# Patient Record
Sex: FEMALE | Race: WHITE | URBAN - METROPOLITAN AREA
[De-identification: names, ages, dates, MRNs, and addresses within clinical notes are randomized per-mention and may not be internally consistent; named-entity substitution may affect disease eponyms.]

---

## 2023-12-08 ENCOUNTER — TELEPHONE (OUTPATIENT)
Age: 68
End: 2023-12-08

## 2024-03-07 ENCOUNTER — HOSPITAL ENCOUNTER (OUTPATIENT)
Facility: HOSPITAL | Age: 69
Setting detail: OBSERVATION
Discharge: HOME | End: 2024-03-08
Attending: EMERGENCY MEDICINE | Admitting: HOSPITALIST
Payer: COMMERCIAL

## 2024-03-07 ENCOUNTER — APPOINTMENT (EMERGENCY)
Dept: CT IMAGING | Facility: HOSPITAL | Age: 69
End: 2024-03-07
Payer: COMMERCIAL

## 2024-03-07 DIAGNOSIS — H53.9 VISUAL DISTURBANCE: Primary | ICD-10-CM

## 2024-03-07 DIAGNOSIS — I10 HYPERTENSION: ICD-10-CM

## 2024-03-07 DIAGNOSIS — R42 DIZZINESS: ICD-10-CM

## 2024-03-07 DIAGNOSIS — I65.02 STENOSIS OF LEFT VERTEBRAL ARTERY: ICD-10-CM

## 2024-03-07 LAB
ALBUMIN SERPL BCP-MCNC: 4.2 G/DL (ref 3.5–5)
ALP SERPL-CCNC: 84 U/L (ref 34–104)
ALT SERPL W P-5'-P-CCNC: 15 U/L (ref 7–52)
ANION GAP SERPL CALCULATED.3IONS-SCNC: 6 MMOL/L
APTT PPP: 35 SECONDS (ref 23–37)
AST SERPL W P-5'-P-CCNC: 19 U/L (ref 13–39)
ATRIAL RATE: 62 BPM
BILIRUB SERPL-MCNC: 0.35 MG/DL (ref 0.2–1)
BUN SERPL-MCNC: 20 MG/DL (ref 5–25)
CALCIUM SERPL-MCNC: 10.4 MG/DL (ref 8.4–10.2)
CARDIAC TROPONIN I PNL SERPL HS: 6 NG/L
CHLORIDE SERPL-SCNC: 103 MMOL/L (ref 96–108)
CO2 SERPL-SCNC: 29 MMOL/L (ref 21–32)
CREAT SERPL-MCNC: 0.84 MG/DL (ref 0.6–1.3)
ERYTHROCYTE [DISTWIDTH] IN BLOOD BY AUTOMATED COUNT: 12.6 % (ref 11.6–15.1)
GFR SERPL CREATININE-BSD FRML MDRD: 70 ML/MIN/1.73SQ M
GLUCOSE SERPL-MCNC: 142 MG/DL (ref 65–140)
HCT VFR BLD AUTO: 41.1 % (ref 34.8–46.1)
HGB BLD-MCNC: 13.4 G/DL (ref 11.5–15.4)
INR PPP: 1.07 (ref 0.84–1.19)
MCH RBC QN AUTO: 30.2 PG (ref 26.8–34.3)
MCHC RBC AUTO-ENTMCNC: 32.6 G/DL (ref 31.4–37.4)
MCV RBC AUTO: 93 FL (ref 82–98)
P AXIS: 73 DEGREES
PLATELET # BLD AUTO: 194 THOUSANDS/UL (ref 149–390)
PMV BLD AUTO: 10.3 FL (ref 8.9–12.7)
POTASSIUM SERPL-SCNC: 4.3 MMOL/L (ref 3.5–5.3)
PR INTERVAL: 154 MS
PROT SERPL-MCNC: 7.4 G/DL (ref 6.4–8.4)
PROTHROMBIN TIME: 14.5 SECONDS (ref 11.6–14.5)
QRS AXIS: 19 DEGREES
QRSD INTERVAL: 78 MS
QT INTERVAL: 422 MS
QTC INTERVAL: 428 MS
RBC # BLD AUTO: 4.44 MILLION/UL (ref 3.81–5.12)
SODIUM SERPL-SCNC: 138 MMOL/L (ref 135–147)
T WAVE AXIS: -57 DEGREES
VENTRICULAR RATE: 62 BPM
WBC # BLD AUTO: 10.24 THOUSAND/UL (ref 4.31–10.16)

## 2024-03-07 PROCEDURE — 80061 LIPID PANEL: CPT | Performed by: NURSE PRACTITIONER

## 2024-03-07 PROCEDURE — 99285 EMERGENCY DEPT VISIT HI MDM: CPT | Performed by: EMERGENCY MEDICINE

## 2024-03-07 PROCEDURE — 36415 COLL VENOUS BLD VENIPUNCTURE: CPT

## 2024-03-07 PROCEDURE — 84484 ASSAY OF TROPONIN QUANT: CPT

## 2024-03-07 PROCEDURE — 93005 ELECTROCARDIOGRAM TRACING: CPT

## 2024-03-07 PROCEDURE — 85610 PROTHROMBIN TIME: CPT

## 2024-03-07 PROCEDURE — 99284 EMERGENCY DEPT VISIT MOD MDM: CPT

## 2024-03-07 PROCEDURE — 70496 CT ANGIOGRAPHY HEAD: CPT

## 2024-03-07 PROCEDURE — 85027 COMPLETE CBC AUTOMATED: CPT

## 2024-03-07 PROCEDURE — 70498 CT ANGIOGRAPHY NECK: CPT

## 2024-03-07 PROCEDURE — 80053 COMPREHEN METABOLIC PANEL: CPT | Performed by: EMERGENCY MEDICINE

## 2024-03-07 PROCEDURE — 85730 THROMBOPLASTIN TIME PARTIAL: CPT

## 2024-03-07 RX ORDER — TETRACAINE HYDROCHLORIDE 5 MG/ML
2 SOLUTION OPHTHALMIC ONCE
Status: COMPLETED | OUTPATIENT
Start: 2024-03-07 | End: 2024-03-07

## 2024-03-07 RX ADMIN — IOHEXOL 85 ML: 350 INJECTION, SOLUTION INTRAVENOUS at 21:16

## 2024-03-07 RX ADMIN — TETRACAINE HYDROCHLORIDE 2 DROP: 5 SOLUTION OPHTHALMIC at 23:47

## 2024-03-08 ENCOUNTER — APPOINTMENT (OUTPATIENT)
Dept: MRI IMAGING | Facility: HOSPITAL | Age: 69
End: 2024-03-08
Payer: COMMERCIAL

## 2024-03-08 VITALS
HEART RATE: 64 BPM | WEIGHT: 244.71 LBS | HEIGHT: 63 IN | DIASTOLIC BLOOD PRESSURE: 67 MMHG | BODY MASS INDEX: 43.36 KG/M2 | RESPIRATION RATE: 16 BRPM | TEMPERATURE: 97.9 F | OXYGEN SATURATION: 100 % | SYSTOLIC BLOOD PRESSURE: 153 MMHG

## 2024-03-08 PROBLEM — I48.19 PERSISTENT ATRIAL FIBRILLATION (HCC): Chronic | Status: ACTIVE | Noted: 2020-09-06

## 2024-03-08 PROBLEM — I10 HYPERTENSION: Status: ACTIVE | Noted: 2017-12-05

## 2024-03-08 PROBLEM — R42 DIZZINESS: Status: ACTIVE | Noted: 2024-03-08

## 2024-03-08 PROBLEM — E11.59 HYPERTENSION ASSOCIATED WITH TYPE 2 DIABETES MELLITUS: Chronic | Status: ACTIVE | Noted: 2017-12-05

## 2024-03-08 PROBLEM — I15.2 HYPERTENSION ASSOCIATED WITH TYPE 2 DIABETES MELLITUS: Chronic | Status: ACTIVE | Noted: 2017-12-05

## 2024-03-08 PROBLEM — E66.813 CLASS 3 SEVERE OBESITY DUE TO EXCESS CALORIES WITH SERIOUS COMORBIDITY IN ADULT (HCC): Status: ACTIVE | Noted: 2023-02-14

## 2024-03-08 PROBLEM — Z79.01 LONG TERM CURRENT USE OF ANTICOAGULANT THERAPY: Chronic | Status: ACTIVE | Noted: 2023-09-29

## 2024-03-08 PROBLEM — E66.01 CLASS 3 SEVERE OBESITY DUE TO EXCESS CALORIES WITH SERIOUS COMORBIDITY IN ADULT (HCC): Status: ACTIVE | Noted: 2023-02-14

## 2024-03-08 PROBLEM — E78.5 HYPERLIPIDEMIA: Status: ACTIVE | Noted: 2017-12-05
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
## 2024-03-08 PROBLEM — G47.33 OBSTRUCTIVE SLEEP APNEA: Chronic | Status: ACTIVE | Noted: 2017-12-05

## 2024-03-08 PROBLEM — E11.9 TYPE 2 DIABETES MELLITUS WITHOUT COMPLICATION, WITHOUT LONG-TERM CURRENT USE OF INSULIN (HCC): Chronic | Status: ACTIVE | Noted: 2017-12-05

## 2024-03-08 PROBLEM — E78.5 HYPERLIPIDEMIA ASSOCIATED WITH TYPE 2 DIABETES MELLITUS: Chronic | Status: ACTIVE | Noted: 2017-12-05

## 2024-03-08 PROBLEM — E11.69 HYPERLIPIDEMIA ASSOCIATED WITH TYPE 2 DIABETES MELLITUS: Chronic | Status: ACTIVE | Noted: 2017-12-05

## 2024-03-08 LAB
2HR DELTA HS TROPONIN: 1 NG/L
4HR DELTA HS TROPONIN: 0 NG/L
CARDIAC TROPONIN I PNL SERPL HS: 6 NG/L
CARDIAC TROPONIN I PNL SERPL HS: 7 NG/L
CHOLEST SERPL-MCNC: 159 MG/DL
GLUCOSE SERPL-MCNC: 118 MG/DL (ref 65–140)
GLUCOSE SERPL-MCNC: 151 MG/DL (ref 65–140)
HDLC SERPL-MCNC: 68 MG/DL
LDLC SERPL CALC-MCNC: 77 MG/DL (ref 0–100)
TRIGL SERPL-MCNC: 69 MG/DL

## 2024-03-08 PROCEDURE — 99223 1ST HOSP IP/OBS HIGH 75: CPT | Performed by: NURSE PRACTITIONER

## 2024-03-08 PROCEDURE — 84484 ASSAY OF TROPONIN QUANT: CPT

## 2024-03-08 PROCEDURE — 70551 MRI BRAIN STEM W/O DYE: CPT

## 2024-03-08 PROCEDURE — 97129 THER IVNTJ 1ST 15 MIN: CPT

## 2024-03-08 PROCEDURE — 97167 OT EVAL HIGH COMPLEX 60 MIN: CPT

## 2024-03-08 PROCEDURE — 99239 HOSP IP/OBS DSCHRG MGMT >30: CPT | Performed by: INTERNAL MEDICINE

## 2024-03-08 PROCEDURE — 82948 REAGENT STRIP/BLOOD GLUCOSE: CPT

## 2024-03-08 PROCEDURE — 36415 COLL VENOUS BLD VENIPUNCTURE: CPT

## 2024-03-08 RX ORDER — LOSARTAN POTASSIUM 25 MG/1
12.5 TABLET ORAL DAILY
Status: DISCONTINUED | OUTPATIENT
Start: 2024-03-08 | End: 2024-03-08

## 2024-03-08 RX ORDER — ATORVASTATIN CALCIUM 10 MG/1
10 TABLET, FILM COATED ORAL
COMMUNITY

## 2024-03-08 RX ORDER — SOTALOL HYDROCHLORIDE 80 MG/1
80 TABLET ORAL EVERY 12 HOURS
COMMUNITY
Start: 2023-11-20 | End: 2024-11-19

## 2024-03-08 RX ORDER — LOSARTAN POTASSIUM 25 MG/1
25 TABLET ORAL ONCE
Status: DISCONTINUED | OUTPATIENT
Start: 2024-03-08 | End: 2024-03-08 | Stop reason: HOSPADM

## 2024-03-08 RX ORDER — LOSARTAN POTASSIUM 50 MG/1
50 TABLET ORAL DAILY
Status: DISCONTINUED | OUTPATIENT
Start: 2024-03-09 | End: 2024-03-08

## 2024-03-08 RX ORDER — SOTALOL HYDROCHLORIDE 80 MG/1
80 TABLET ORAL EVERY 12 HOURS SCHEDULED
Status: DISCONTINUED | OUTPATIENT
Start: 2024-03-08 | End: 2024-03-08 | Stop reason: HOSPADM

## 2024-03-08 RX ORDER — HYDRALAZINE HYDROCHLORIDE 20 MG/ML
10 INJECTION INTRAMUSCULAR; INTRAVENOUS EVERY 6 HOURS PRN
Status: DISCONTINUED | OUTPATIENT
Start: 2024-03-08 | End: 2024-03-08 | Stop reason: HOSPADM

## 2024-03-08 RX ORDER — ATORVASTATIN CALCIUM 40 MG/1
40 TABLET, FILM COATED ORAL EVERY EVENING
Qty: 30 TABLET | Refills: 0 | Status: CANCELLED | OUTPATIENT
Start: 2024-03-08 | End: 2024-04-07

## 2024-03-08 RX ORDER — CHOLECALCIFEROL (VITAMIN D3) 25 MCG
1000 CAPSULE ORAL 2 TIMES DAILY
COMMUNITY

## 2024-03-08 RX ORDER — LOSARTAN POTASSIUM 25 MG/1
0.5 TABLET ORAL DAILY
COMMUNITY
Start: 2023-11-07 | End: 2024-03-08

## 2024-03-08 RX ORDER — INSULIN LISPRO 100 [IU]/ML
1-5 INJECTION, SOLUTION INTRAVENOUS; SUBCUTANEOUS
Status: DISCONTINUED | OUTPATIENT
Start: 2024-03-08 | End: 2024-03-08 | Stop reason: HOSPADM

## 2024-03-08 RX ORDER — LOSARTAN POTASSIUM 25 MG/1
25 TABLET ORAL DAILY
Status: DISCONTINUED | OUTPATIENT
Start: 2024-03-09 | End: 2024-03-08 | Stop reason: HOSPADM

## 2024-03-08 RX ORDER — ATORVASTATIN CALCIUM 40 MG/1
40 TABLET, FILM COATED ORAL EVERY EVENING
Status: DISCONTINUED | OUTPATIENT
Start: 2024-03-08 | End: 2024-03-08 | Stop reason: HOSPADM

## 2024-03-08 RX ORDER — INSULIN LISPRO 100 [IU]/ML
1-6 INJECTION, SOLUTION INTRAVENOUS; SUBCUTANEOUS
Status: DISCONTINUED | OUTPATIENT
Start: 2024-03-08 | End: 2024-03-08 | Stop reason: HOSPADM

## 2024-03-08 RX ORDER — MELATONIN
1000
Status: DISCONTINUED | OUTPATIENT
Start: 2024-03-08 | End: 2024-03-08 | Stop reason: HOSPADM

## 2024-03-08 RX ORDER — LOSARTAN POTASSIUM 25 MG/1
25 TABLET ORAL DAILY
Qty: 30 TABLET | Refills: 0 | Status: SHIPPED | OUTPATIENT
Start: 2024-03-09 | End: 2024-04-08

## 2024-03-08 RX ADMIN — Medication 1000 UNITS: at 01:59

## 2024-03-08 RX ADMIN — APIXABAN 5 MG: 5 TABLET, FILM COATED ORAL at 01:59

## 2024-03-08 RX ADMIN — SOTALOL HYDROCHLORIDE 80 MG: 80 TABLET ORAL at 02:06

## 2024-03-08 RX ADMIN — HYDRALAZINE HYDROCHLORIDE 10 MG: 20 INJECTION, SOLUTION INTRAMUSCULAR; INTRAVENOUS at 10:44

## 2024-03-08 RX ADMIN — APIXABAN 5 MG: 5 TABLET, FILM COATED ORAL at 14:14

## 2024-03-08 RX ADMIN — LOSARTAN POTASSIUM 12.5 MG: 25 TABLET, FILM COATED ORAL at 10:41

## 2024-03-08 RX ADMIN — SOTALOL HYDROCHLORIDE 80 MG: 80 TABLET ORAL at 14:14

## 2024-03-08 NOTE — ASSESSMENT & PLAN NOTE
Blood pressure elevated on arrival.  Last recorded pressure: 171/70  Remained elevated while in observation     Plan   Increase Losartan dose from 12.5 mg to 25 mg daily  Outpatient follow-up with PCP in 1 week of discharge

## 2024-03-08 NOTE — ASSESSMENT & PLAN NOTE
Lab Results   Component Value Date    HGBA1C 6.6 (H) 01/22/2024       Recent Labs     03/08/24  0710 03/08/24  1132   POCGLU 118 151*       Blood Sugar Average: Last 72 hrs:  Continue home regimen of metformin.
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
## 2024-03-08 NOTE — ASSESSMENT & PLAN NOTE
Patient reports palpitations two days ago but denies at this time.  Rate/Rhythm Control: Sotalol  Antiplatelet/Anticoagulation: Eliquis
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
## 2024-03-08 NOTE — ASSESSMENT & PLAN NOTE
"Lab Results   Component Value Date    HGBA1C 6.6 (H) 01/22/2024       No results for input(s): \"POCGLU\" in the last 72 hours.    Blood Sugar Average: Last 72 hrs:  Home regimen of Metformin, hold while inpatient.  Accu-checks and SSI.  Hypoglycemia protocol.    "
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
## 2024-03-08 NOTE — QUICK NOTE
For documentation purposes for scheduling    Patient to follow-up with general neurology attending/AP/resident in 6 to 8 weeks

## 2024-03-08 NOTE — DISCHARGE INSTR - AVS FIRST PAGE
Dear Elena Bhardwaj,     It was our pleasure to care for you here at [facility].  It is our hope that we were always able to exceed the expected standards for your care during your stay.  You were hospitalized due to visual disturbance and dizziness.  You were cared for in the ED by Rita Paredes DO under the service of Soren Don MD with the [facility] who covers for your primary care physician (PCP), No primary care provider on file., while you were hospitalized.  If you have any questions or concerns related to this hospitalization, you may contact us at .  For follow up as well as any medication refills, we recommend that you follow up with your primary care physician.  A registered nurse will reach out to you by phone within a few days after your discharge to answer any additional questions that you may have after going home.  However, at this time we provide for you here, the most important instructions / recommendations at discharge:     Notable Medication Adjustments -   Please INCREASE your dose of Losartan to 25 mg daily by mouth  Testing Required after Discharge -   No testing required after discharge   Important follow up information -   Please follow-up with your PCP within one week of discharge to discuss your recent hospitalization and to follow-up on your blood pressure given the recent medication adjustments  If your symptoms return, you may elect to schedule an appoint with ENT for further management options. A referral has been placed on your behalf.   Other Instructions -   If you begin to experience sudden onset of facial droop, slurred speech, or extremity weakness, please go to the nearest ED.  Please review this entire after visit summary as additional general instructions including medication list, appointments, activity, diet, any pertinent wound care, and other additional recommendations from your care team that  may be provided for you.      Sincerely,     Rita Paredes, DO
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
## 2024-03-08 NOTE — ASSESSMENT & PLAN NOTE
Blood pressure elevated on arrival.  Last recorded pressure: 171/70  Continue Losartan and Sotalol.  Monitor blood pressure.
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
## 2024-03-11 LAB
ATRIAL RATE: 62 BPM
P AXIS: 73 DEGREES
PR INTERVAL: 154 MS
QRS AXIS: 19 DEGREES
QRSD INTERVAL: 78 MS
QT INTERVAL: 422 MS
QTC INTERVAL: 428 MS
T WAVE AXIS: -57 DEGREES
VENTRICULAR RATE: 62 BPM

## 2024-03-11 PROCEDURE — 93010 ELECTROCARDIOGRAM REPORT: CPT | Performed by: INTERNAL MEDICINE

## 2024-04-03 ENCOUNTER — OFFICE VISIT (OUTPATIENT)
Dept: AUDIOLOGY | Age: 69
End: 2024-04-03
Payer: COMMERCIAL

## 2024-04-03 DIAGNOSIS — R42 DIZZINESS: ICD-10-CM

## 2024-04-03 DIAGNOSIS — R26.89 IMBALANCE: ICD-10-CM

## 2024-04-03 PROCEDURE — 92567 TYMPANOMETRY: CPT | Performed by: AUDIOLOGIST

## 2024-04-03 PROCEDURE — 92537 CALORIC VSTBLR TEST W/REC: CPT | Performed by: AUDIOLOGIST

## 2024-04-03 PROCEDURE — 92540 BASIC VESTIBULAR EVALUATION: CPT | Performed by: AUDIOLOGIST
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
## 2024-04-09 ENCOUNTER — TELEPHONE (OUTPATIENT)
Dept: NEUROLOGY | Facility: CLINIC | Age: 69
End: 2024-04-09

## 2024-04-09 NOTE — TELEPHONE ENCOUNTER
Patient is now scheduled with Reggie Jordan, 3 pm, 5/9/2024 HFU Slot.      HFU/ SL RANDELL/ DIZZINESS, VISUAL DISTURBANCE    DC- HOME- 3/8/2024    Patient to follow-up with general neurology attending/AP/resident in 6 to 8 weeks

## 2024-05-03 ENCOUNTER — TELEPHONE (OUTPATIENT)
Age: 69
End: 2024-05-03

## 2024-05-03 DIAGNOSIS — Z12.11 SCREENING FOR COLON CANCER: Primary | ICD-10-CM

## 2024-05-06 ENCOUNTER — TELEPHONE (OUTPATIENT)
Dept: NEUROLOGY | Facility: CLINIC | Age: 69
End: 2024-05-06

## 2024-05-06 NOTE — TELEPHONE ENCOUNTER
Spoke with patient and confirmed appointment with Dr. Gomez 5/9 at [facility].
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
## 2024-05-08 NOTE — ASSESSMENT & PLAN NOTE
Patient is a 68 year old woman with hx of Afib on eliquis, possible lyme disease, T2DM, HTN presenting for ED followup for transient dizziness, lightheadedness, visual disturbances. Lasted for 15 minutes before complete resolution. No prodromal symptoms. Patient did have headaches afterward. Patient did not have further symptoms with transient dizziness, lightheadedness, and visual disturbance afterwards. She denies new focal numbness, weakness, visual disturbances, ambulatory dysfunction.     Workup:  MRI brain wo contrast 3/8/2024: no acute infarct and mild microangiopathic change  CTA head and neck w and wo contrast 3/7/2024: no hemodynamic stenosis, aneurysm, occlusion  Vitamin B12 12/1/2023: 216    Impression: Broad differential for transient neurologic symptoms above include atypical migraine with brainstem/ocular aura, cardiogenic syncope, Symptomatic low B12 perhaps with autoimmune component.     Plan:  Discussed with Dr. Romano  Ordered ESR, CRP, B1, intrinsic factor blocking antibody for potential inflammatory conditions that may cause transient visual disturbances  Ordered B12 injections 1g weekly for 4 doses then B12 injections 1g monthly for 5 doses; can recheck B12 afterwards to monitor for improvment   Alternative is sublingual B12  Discussed regular hydration daily as recommended by PCP  Patient to followup in 4 months  Patient agreeable to above plan

## 2024-05-09 ENCOUNTER — OFFICE VISIT (OUTPATIENT)
Dept: NEUROLOGY | Facility: CLINIC | Age: 69
End: 2024-05-09
Payer: COMMERCIAL

## 2024-05-09 VITALS
DIASTOLIC BLOOD PRESSURE: 60 MMHG | WEIGHT: 230 LBS | HEART RATE: 86 BPM | SYSTOLIC BLOOD PRESSURE: 130 MMHG | BODY MASS INDEX: 40.74 KG/M2

## 2024-05-09 DIAGNOSIS — H53.9 VISUAL DISTURBANCE: ICD-10-CM

## 2024-05-09 DIAGNOSIS — E53.8 LOW SERUM VITAMIN B12: ICD-10-CM

## 2024-05-09 DIAGNOSIS — R42 DIZZINESS: Primary | ICD-10-CM

## 2024-05-09 PROCEDURE — 96372 THER/PROPH/DIAG INJ SC/IM: CPT | Performed by: PSYCHIATRY & NEUROLOGY

## 2024-05-09 PROCEDURE — 99204 OFFICE O/P NEW MOD 45 MIN: CPT | Performed by: PSYCHIATRY & NEUROLOGY

## 2024-05-09 RX ORDER — CYANOCOBALAMIN 1000 UG/ML
1000 INJECTION, SOLUTION INTRAMUSCULAR; SUBCUTANEOUS
Status: SHIPPED | OUTPATIENT
Start: 2024-05-09 | End: 2024-06-06

## 2024-05-09 RX ORDER — CYANOCOBALAMIN 1000 UG/ML
1000 INJECTION, SOLUTION INTRAMUSCULAR; SUBCUTANEOUS
Status: SHIPPED | OUTPATIENT
Start: 2024-06-27 | End: 2024-11-24

## 2024-05-09 RX ADMIN — CYANOCOBALAMIN 1000 MCG: 1000 INJECTION, SOLUTION INTRAMUSCULAR; SUBCUTANEOUS at 16:31

## 2024-05-09 NOTE — PATIENT INSTRUCTIONS
Please come to the clinic for B12 injections every week (starting today) for 4 doses and then injections every month for 5 doses    Please get bloodwork for intrinsic factor antibody that can cause low B12    Please get other bloodwork as well    Please keep rehydrating as your primary care doctor instructed    Followup in 4 months

## 2024-05-11 LAB — COLOGUARD RESULT REPORTABLE: NORMAL

## 2024-05-13 ENCOUNTER — TELEPHONE (OUTPATIENT)
Dept: NEUROLOGY | Facility: CLINIC | Age: 69
End: 2024-05-13

## 2024-05-14 ENCOUNTER — TELEPHONE (OUTPATIENT)
Age: 69
End: 2024-05-14

## 2024-05-14 DIAGNOSIS — Z12.11 SCREENING FOR COLON CANCER: Primary | ICD-10-CM

## 2024-05-16 ENCOUNTER — CLINICAL SUPPORT (OUTPATIENT)
Dept: NEUROLOGY | Facility: CLINIC | Age: 69
End: 2024-05-16
Payer: COMMERCIAL

## 2024-05-16 DIAGNOSIS — E53.8 LOW SERUM VITAMIN B12: Primary | ICD-10-CM

## 2024-05-16 PROCEDURE — 96372 THER/PROPH/DIAG INJ SC/IM: CPT | Performed by: PSYCHIATRY & NEUROLOGY

## 2024-05-20 ENCOUNTER — TELEPHONE (OUTPATIENT)
Dept: NEUROLOGY | Facility: CLINIC | Age: 69
End: 2024-05-20

## 2024-05-20 NOTE — TELEPHONE ENCOUNTER
Left voicemail for patient to call back or on my chart confirm B12 injection appointment for 5/23 at 9 am in [facility] office.

## 2024-05-21 NOTE — TELEPHONE ENCOUNTER
Spoke with patient and confirm B12 injection appointment for 5/23 at [facility].

## 2024-05-23 ENCOUNTER — CLINICAL SUPPORT (OUTPATIENT)
Dept: NEUROLOGY | Facility: CLINIC | Age: 69
End: 2024-05-23
Payer: COMMERCIAL

## 2024-05-23 DIAGNOSIS — E53.8 B12 DEFICIENCY: Primary | ICD-10-CM

## 2024-05-23 PROCEDURE — 96372 THER/PROPH/DIAG INJ SC/IM: CPT | Performed by: PSYCHIATRY & NEUROLOGY

## 2024-05-23 RX ADMIN — CYANOCOBALAMIN 1000 MCG: 1000 INJECTION, SOLUTION INTRAMUSCULAR; SUBCUTANEOUS at 15:50

## 2024-05-23 NOTE — PROGRESS NOTES
Patient was administered B12 injection on the L Deltoid via IM 1ml. Patient was monitored and left with no complaints.

## 2024-05-28 LAB — COLOGUARD RESULT REPORTABLE: NEGATIVE

## 2024-05-29 ENCOUNTER — TELEPHONE (OUTPATIENT)
Dept: NEUROLOGY | Facility: CLINIC | Age: 69
End: 2024-05-29

## 2024-05-30 ENCOUNTER — CLINICAL SUPPORT (OUTPATIENT)
Dept: NEUROLOGY | Facility: CLINIC | Age: 69
End: 2024-05-30
Payer: COMMERCIAL

## 2024-05-30 DIAGNOSIS — E53.8 VITAMIN B 12 DEFICIENCY: Primary | ICD-10-CM

## 2024-05-30 PROCEDURE — 96372 THER/PROPH/DIAG INJ SC/IM: CPT | Performed by: PSYCHIATRY & NEUROLOGY

## 2024-05-30 RX ADMIN — CYANOCOBALAMIN 1000 MCG: 1000 INJECTION, SOLUTION INTRAMUSCULAR; SUBCUTANEOUS at 10:00

## 2024-07-08 ENCOUNTER — CLINICAL SUPPORT (OUTPATIENT)
Dept: NEUROLOGY | Facility: CLINIC | Age: 69
End: 2024-07-08
Payer: COMMERCIAL

## 2024-07-08 DIAGNOSIS — E53.8 B12 DEFICIENCY: Primary | ICD-10-CM

## 2024-07-08 PROCEDURE — 96372 THER/PROPH/DIAG INJ SC/IM: CPT | Performed by: INTERNAL MEDICINE

## 2024-07-08 RX ADMIN — CYANOCOBALAMIN 1000 MCG: 1000 INJECTION, SOLUTION INTRAMUSCULAR; SUBCUTANEOUS at 09:06

## 2024-08-02 ENCOUNTER — CLINICAL SUPPORT (OUTPATIENT)
Dept: NEUROLOGY | Facility: CLINIC | Age: 69
End: 2024-08-02
Payer: COMMERCIAL

## 2024-08-02 DIAGNOSIS — E53.8 B12 DEFICIENCY: Primary | ICD-10-CM

## 2024-08-02 PROCEDURE — 96372 THER/PROPH/DIAG INJ SC/IM: CPT | Performed by: PSYCHIATRY & NEUROLOGY

## 2024-08-02 RX ADMIN — CYANOCOBALAMIN 1000 MCG: 1000 INJECTION, SOLUTION INTRAMUSCULAR; SUBCUTANEOUS at 15:03

## 2024-09-06 ENCOUNTER — CLINICAL SUPPORT (OUTPATIENT)
Dept: NEUROLOGY | Facility: CLINIC | Age: 69
End: 2024-09-06
Payer: COMMERCIAL

## 2024-09-06 DIAGNOSIS — E53.8 B12 DEFICIENCY: Primary | ICD-10-CM

## 2024-09-06 PROCEDURE — 96372 THER/PROPH/DIAG INJ SC/IM: CPT | Performed by: PSYCHIATRY & NEUROLOGY

## 2024-09-06 RX ADMIN — CYANOCOBALAMIN 1000 MCG: 1000 INJECTION, SOLUTION INTRAMUSCULAR; SUBCUTANEOUS at 14:52

## 2024-09-12 ENCOUNTER — OFFICE VISIT (OUTPATIENT)
Dept: NEUROLOGY | Facility: CLINIC | Age: 69
End: 2024-09-12
Payer: COMMERCIAL

## 2024-09-12 VITALS
WEIGHT: 236 LBS | HEIGHT: 63 IN | OXYGEN SATURATION: 98 % | DIASTOLIC BLOOD PRESSURE: 69 MMHG | SYSTOLIC BLOOD PRESSURE: 130 MMHG | HEART RATE: 57 BPM | BODY MASS INDEX: 41.82 KG/M2

## 2024-09-12 DIAGNOSIS — R42 DIZZINESS: Primary | ICD-10-CM
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
## 2024-09-12 PROCEDURE — 99214 OFFICE O/P EST MOD 30 MIN: CPT | Performed by: PSYCHIATRY & NEUROLOGY

## 2024-09-12 NOTE — ASSESSMENT & PLAN NOTE
Patient notes that she had a fall where she had loss of consciousness and fell and hit the back of her head.  She noted some neck and back pain after waking up from her fall, did not think of going to the ER at this time.  She had this neck and back pain for about 3 days before resolving.  She states that she is back to her baseline and her physical exam does not show any focal findings.  She has a negative Romberg and her 1 leg standing balance is good.     As patient is on Eliquis for history of atrial fibrillation we did recommend the patient get a CT head.  As patient does not have any neurologic symptoms or findings on exam this study does not need to be done urgently, but should be done to rule out any small bleeds.    Plan:  Obtain CT head without contrast.

## 2024-09-12 NOTE — PROGRESS NOTES
Patient ID: Elena Bhardwaj is a 69 y.o. female.    Assessment/Plan:    Dizziness  Patient is a 69 year old woman with hx of Afib on eliquis, possible lyme disease, T2DM, HTN presenting for ED followup for transient dizziness, lightheadedness, visual disturbances. Lasted for 15 minutes before complete resolution. No prodromal symptoms. Patient did have headaches afterward. Patient did not have further symptoms with transient dizziness, lightheadedness, and visual disturbance afterwards. She denies new focal numbness, weakness, visual disturbances, ambulatory dysfunction.     Symptoms have resolved after decreasing sotalol from 80 mg twice daily to 40 mg twice daily per her cardiologist.    Workup:  MRI brain wo contrast 3/8/2024: no acute infarct and mild microangiopathic change  CTA head and neck w and wo contrast 3/7/2024: no hemodynamic stenosis, aneurysm, occlusion  Vitamin B12 12/1/2023: 216  5/2024: 5549    Impression: Patient dizziness spells have since resolved after decreasing her sotalol from 80 mg twice daily to 40 mg twice daily.  It is unlikely that patient has any neurologic component to this dizziness, vitamin B12 supplementation generally does not improve any central causes of the symptoms.    Plan:  Discussed with Dr. Romano  As patient denies any dizziness she does not need to obtain lab work.  Patient may continue to get her B12 injections up to November, recommended patient to take supplemental B12 orally 1000 mcg daily.  Continue regular hydration.  Patient to followup in 1 year  Patient agreeable to above plan      Fall  Patient notes that she had a fall where she had loss of consciousness and fell and hit the back of her head.  She noted some neck and back pain after waking up from her fall, did not think of going to the ER at this time.  She had this neck and back pain for about 3 days before resolving.  She states that she is back to her baseline and her physical exam does not show any  "focal findings.  She has a negative Romberg and her 1 leg standing balance is good.     As patient is on Eliquis for history of atrial fibrillation we did recommend the patient get a CT head.  As patient does not have any neurologic symptoms or findings on exam this study does not need to be done urgently, but should be done to rule out any small bleeds.    Plan:  Obtain CT head without contrast.       Diagnoses and all orders for this visit:    Dizziness    Fall  -     CT head wo contrast; Future             Subjective:    Patient is a 69 year old woman with hx of Afib on eliquis, possible lyme disease 2005, HTN, VANNA, T2DM, HLD presenting for followup for Dizziness 3/7/2024.  She notes resolution of her dizziness spells since decreasing her Sotalol 40 mg BID.  She also notes improvement of her hydration and drinks about 64 oz a day of water.  She denies any headaches, lightheadedness, weakness, numbness or tingling, visual disturbances.  She continues to use her mouthguard for VANNA and state sit works well for her.  She states she has some vision blurriness but is following her eye doctor for a procedure in the coming months.    She denies any gait instability but states she had a fall at the end of June with head strike and LOC before she had her fall.  She notes having some back and neck pain for a few days but since then she has felt back to her normal.     She did not go the ED after this fall.              5/24 LOV  \"Patient is a 68 year old woman with hx of Afib on eliquis, possible lyme disease 2005, HTN, VANNA, T2DM, HLD presenting for follow up for Dizziness 3/7/2024.  Patient had sudden onset visual disturbances and described \"the road all of a sudden became wavy like a big black swirl.\"  Patient also had dizziness, lightheadedness, chest tightness.  Symptoms lasted 15 minutes before resolving. No trauma involving. CTA head and neck 3/7 showed no hemodynamic stenosis, aneurysm, occlusion and MRI brain wo " "contrast 3/8 showed no acute infarct and mild microangiopathic change. Patient had no recent stress until after the visit and patient had headaches afterward which she attributes to stress over the ED visit. Neurology thought it to be symptomatic Afib vs migraine w aura. Patient currently on Eliquis 5mg BID for Afib.  No smoking, no alcohol use, no recreational drugs.     Patient said September 2020, patient had vertigo and diagnosed with Afib at that month. Vertigo was not positional and lasted for a day. MRI brain w and wo contrast done at that time and patient was found not to have a stroke.\"                  The following portions of the patient's history were reviewed and updated as appropriate: She  has a past medical history of Cancer ([facility]), Diabetes mellitus ([facility]), Dizziness, Hypertension, Sleep apnea, Sleep difficulties, and Stroke ([facility]).  She   Patient Active Problem List    Diagnosis Date Noted    Fall 09/12/2024    Low serum vitamin B12 05/09/2024    Visual disturbance 05/09/2024    Dizziness 03/08/2024    Long term current use of anticoagulant therapy 09/29/2023    Class 3 severe obesity due to excess calories with serious comorbidity in adult (HCC) 02/14/2023    Persistent atrial fibrillation (HCC) 09/06/2020    Type 2 diabetes mellitus without complication, without long-term current use of insulin ([facility]) 12/05/2017    Hypertension 12/05/2017    Hyperlipidemia 12/05/2017    Obstructive sleep apnea 12/05/2017     She  has a past surgical history that includes Tonsillectomy.  Her family history includes Stroke in her maternal aunt and paternal grandmother.  She  reports that she has never smoked. She has never used smokeless tobacco. She reports that she does not drink alcohol and does not use drugs.  Current Outpatient Medications   Medication Sig Dispense Refill    apixaban (ELIQUIS) 5 mg Take 5 mg by mouth 2 (two) times a day      atorvastatin (LIPITOR) 10 mg tablet Take 10 mg by mouth daily at bedtime " Pt only take 5 mg cuts pill in half      Cholecalciferol (Vitamin D-3) 25 MCG (1000 UT) CAPS Take 1,000 Units by mouth 2 (two) times a day      losartan (COZAAR) 25 mg tablet Take 1 tablet (25 mg total) by mouth daily Do not start before March 9, 2024. (Patient taking differently: Take 25 mg by mouth daily Patient take 1/2 tablet daily) 30 tablet 0    metFORMIN (GLUCOPHAGE) 500 mg tablet Take 500 mg by mouth 2 (two) times a day with meals      Multiple Vitamins-Minerals (Multi For Her 50+) TABS       sotalol (BETAPACE) 80 mg tablet Take 40 mg by mouth every 12 (twelve) hours Pt takes 40 mg twice a day      vitamin A 2400 MCG (8000 UT) capsule Take 8,000 Units by mouth daily (Patient not taking: Reported on 3/19/2024)       Current Facility-Administered Medications   Medication Dose Route Frequency Provider Last Rate Last Admin    cyanocobalamin injection 1,000 mcg  1,000 mcg Intramuscular Q30 Days    1,000 mcg at 09/06/24 1452     Current Outpatient Medications on File Prior to Visit   Medication Sig    apixaban (ELIQUIS) 5 mg Take 5 mg by mouth 2 (two) times a day    atorvastatin (LIPITOR) 10 mg tablet Take 10 mg by mouth daily at bedtime Pt only take 5 mg cuts pill in half    Cholecalciferol (Vitamin D-3) 25 MCG (1000 UT) CAPS Take 1,000 Units by mouth 2 (two) times a day    losartan (COZAAR) 25 mg tablet Take 1 tablet (25 mg total) by mouth daily Do not start before March 9, 2024. (Patient taking differently: Take 25 mg by mouth daily Patient take 1/2 tablet daily)    metFORMIN (GLUCOPHAGE) 500 mg tablet Take 500 mg by mouth 2 (two) times a day with meals    Multiple Vitamins-Minerals (Multi For Her 50+) TABS     sotalol (BETAPACE) 80 mg tablet Take 40 mg by mouth every 12 (twelve) hours Pt takes 40 mg twice a day    vitamin A 2400 MCG (8000 UT) capsule Take 8,000 Units by mouth daily (Patient not taking: Reported on 3/19/2024)     Current Facility-Administered Medications on File Prior to Visit   Medication     "cyanocobalamin injection 1,000 mcg     She is allergic to [place] (diagnostic) - food allergy, lisinopril, semaglutide, and clindamycin..         Objective:    Blood pressure 130/69, pulse 57, height 5' 3\" (1.6 m), weight 107 kg (236 lb), SpO2 98%.    Physical Exam  Eyes:      General: Lids are normal.      Extraocular Movements: Extraocular movements intact.      Pupils: Pupils are equal, round, and reactive to light.   Neurological:      Motor: Motor strength is normal.     Coordination: Romberg sign negative.      Deep Tendon Reflexes: Reflexes are normal and symmetric.   Psychiatric:         Speech: Speech normal.         Neurological Exam  Mental Status  Awake, alert and oriented to person, place and time. Speech is normal. Language is fluent with no aphasia. Attention and concentration are normal.    Cranial Nerves  CN II: Visual fields full to confrontation.  CN III, IV, VI: Extraocular movements intact bilaterally. Normal lids and orbits bilaterally. Pupils equal round and reactive to light bilaterally.  CN V: Facial sensation is normal.  CN VII: Full and symmetric facial movement.  CN VIII: Hearing is normal.  CN IX, X: Palate elevates symmetrically  CN XI: Shoulder shrug strength is normal.  CN XII: Tongue midline without atrophy or fasciculations.    Motor  Normal muscle bulk throughout. Normal muscle tone. No abnormal involuntary movements. Strength is 5/5 throughout all four extremities.    Sensory  Light touch is normal in upper and lower extremities.     Reflexes  Deep tendon reflexes are 2+ and symmetric in all four extremities.    Coordination  Right: Finger-to-nose normal. Rapid alternating movement normal. Heel-to-shin normal.Left: Finger-to-nose normal. Rapid alternating movement normal. Heel-to-shin normal.    Gait  Normal casual, toe, heel and tandem gait. Romberg is absent. Unable to rise from chair without using arms.        ROS:    Review of Systems   Constitutional:  Negative for fatigue and " fever.   HENT:  Negative for trouble swallowing and voice change.    Eyes:  Negative for photophobia and visual disturbance.   Respiratory:  Negative for chest tightness and shortness of breath.    Cardiovascular:  Negative for chest pain and palpitations.   Gastrointestinal:  Negative for constipation and diarrhea.   Endocrine: Negative for polyuria.   Genitourinary:  Negative for difficulty urinating and dysuria.   Musculoskeletal:  Positive for back pain. Negative for neck pain.   Skin:  Negative for rash.   Neurological:  Negative for dizziness, tremors, seizures, syncope, facial asymmetry, speech difficulty, weakness, light-headedness, numbness and headaches.   Psychiatric/Behavioral:  Negative for dysphoric mood. The patient is not nervous/anxious.

## 2024-09-12 NOTE — ASSESSMENT & PLAN NOTE
Patient is a 69 year old woman with hx of Afib on eliquis, possible lyme disease, T2DM, HTN presenting for ED followup for transient dizziness, lightheadedness, visual disturbances. Lasted for 15 minutes before complete resolution. No prodromal symptoms. Patient did have headaches afterward. Patient did not have further symptoms with transient dizziness, lightheadedness, and visual disturbance afterwards. She denies new focal numbness, weakness, visual disturbances, ambulatory dysfunction.     Symptoms have resolved after decreasing sotalol from 80 mg twice daily to 40 mg twice daily per her cardiologist.    Workup:  MRI brain wo contrast 3/8/2024: no acute infarct and mild microangiopathic change  CTA head and neck w and wo contrast 3/7/2024: no hemodynamic stenosis, aneurysm, occlusion  Vitamin B12 12/1/2023: 216  5/2024: 5549    Impression: Patient dizziness spells have since resolved after decreasing her sotalol from 80 mg twice daily to 40 mg twice daily.  It is unlikely that patient has any neurologic component to this dizziness, vitamin B12 supplementation generally does not improve any central causes of the symptoms.    Plan:  Discussed with Dr. Romano  As patient denies any dizziness she does not need to obtain lab work.  Patient may continue to get her B12 injections up to November, recommended patient to take supplemental B12 orally 1000 mcg daily.  Continue regular hydration.  Patient to followup in 1 year  Patient agreeable to above plan

## 2024-09-17 ENCOUNTER — LAB (OUTPATIENT)
Dept: LAB | Facility: CLINIC | Age: 69
End: 2024-09-17
Payer: COMMERCIAL

## 2024-09-17 DIAGNOSIS — H53.9 VISUAL DISTURBANCE: ICD-10-CM

## 2024-09-17 DIAGNOSIS — E53.8 LOW SERUM VITAMIN B12: ICD-10-CM

## 2024-09-17 DIAGNOSIS — R42 DIZZINESS: ICD-10-CM

## 2024-09-17 LAB
CRP SERPL QL: 6.7 MG/L
ERYTHROCYTE [SEDIMENTATION RATE] IN BLOOD: 35 MM/HOUR (ref 0–29)

## 2024-09-17 PROCEDURE — 36415 COLL VENOUS BLD VENIPUNCTURE: CPT

## 2024-09-17 PROCEDURE — 86140 C-REACTIVE PROTEIN: CPT

## 2024-09-17 PROCEDURE — 86340 INTRINSIC FACTOR ANTIBODY: CPT

## 2024-09-17 PROCEDURE — 84425 ASSAY OF VITAMIN B-1: CPT

## 2024-09-17 PROCEDURE — 85652 RBC SED RATE AUTOMATED: CPT

## 2024-09-19 LAB — IF BLOCK AB SER QL RIA: 1.1 AU/ML (ref 0–1.1)

## 2024-09-20 LAB — VIT B1 BLD-SCNC: 95.6 NMOL/L (ref 66.5–200)

## 2024-09-24 ENCOUNTER — HOSPITAL ENCOUNTER (OUTPATIENT)
Dept: CT IMAGING | Facility: CLINIC | Age: 69
Discharge: HOME | End: 2024-09-24
Payer: COMMERCIAL
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
## 2024-09-24 PROCEDURE — 70450 CT HEAD/BRAIN W/O DYE: CPT

## 2024-10-04 ENCOUNTER — CLINICAL SUPPORT (OUTPATIENT)
Dept: NEUROLOGY | Facility: CLINIC | Age: 69
End: 2024-10-04
Payer: COMMERCIAL

## 2024-10-04 DIAGNOSIS — E53.8 B12 DEFICIENCY: Primary | ICD-10-CM

## 2024-10-04 PROCEDURE — 96372 THER/PROPH/DIAG INJ SC/IM: CPT | Performed by: PSYCHIATRY & NEUROLOGY

## 2024-10-04 RX ADMIN — CYANOCOBALAMIN 1000 MCG: 1000 INJECTION, SOLUTION INTRAMUSCULAR; SUBCUTANEOUS at 15:30

## 2024-11-04 ENCOUNTER — CLINICAL SUPPORT (OUTPATIENT)
Dept: NEUROLOGY | Facility: CLINIC | Age: 69
End: 2024-11-04
Payer: COMMERCIAL

## 2024-11-04 DIAGNOSIS — E53.8 B12 DEFICIENCY: Primary | ICD-10-CM

## 2024-11-04 PROCEDURE — 96372 THER/PROPH/DIAG INJ SC/IM: CPT | Performed by: PSYCHIATRY & NEUROLOGY

## 2024-11-04 RX ADMIN — CYANOCOBALAMIN 1000 MCG: 1000 INJECTION, SOLUTION INTRAMUSCULAR; SUBCUTANEOUS at 15:35

## 2025-02-26 ENCOUNTER — RESULTS FOLLOW-UP (OUTPATIENT)
Dept: EMERGENCY DEPT | Facility: HOSPITAL | Age: 70
End: 2025-02-26

## 2025-02-26 ENCOUNTER — HOSPITAL ENCOUNTER (EMERGENCY)
Facility: HOSPITAL | Age: 70
Discharge: HOME | End: 2025-02-26
Payer: COMMERCIAL

## 2025-02-26 ENCOUNTER — APPOINTMENT (EMERGENCY)
Dept: RADIOLOGY | Facility: HOSPITAL | Age: 70
End: 2025-02-26
Payer: COMMERCIAL

## 2025-02-26 VITALS
HEART RATE: 71 BPM | RESPIRATION RATE: 17 BRPM | OXYGEN SATURATION: 99 % | TEMPERATURE: 97.8 F | SYSTOLIC BLOOD PRESSURE: 186 MMHG | DIASTOLIC BLOOD PRESSURE: 86 MMHG
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
## 2025-02-26 DIAGNOSIS — R03.0 ELEVATED BLOOD PRESSURE READING: ICD-10-CM

## 2025-02-26 DIAGNOSIS — S93.402A LEFT ANKLE SPRAIN: ICD-10-CM

## 2025-02-26 PROCEDURE — 73610 X-RAY EXAM OF ANKLE: CPT

## 2025-02-26 PROCEDURE — 73590 X-RAY EXAM OF LOWER LEG: CPT

## 2025-02-26 PROCEDURE — 99284 EMERGENCY DEPT VISIT MOD MDM: CPT

## 2025-02-26 PROCEDURE — 99283 EMERGENCY DEPT VISIT LOW MDM: CPT

## 2025-02-26 RX ORDER — ACETAMINOPHEN 325 MG/1
975 TABLET ORAL ONCE
Status: COMPLETED | OUTPATIENT
Start: 2025-02-26 | End: 2025-02-26

## 2025-02-26 RX ADMIN — ACETAMINOPHEN 975 MG: 325 TABLET, FILM COATED ORAL at 01:32

## 2025-02-26 NOTE — DISCHARGE INSTRUCTIONS
Recommend nonweightbearing until seen by orthopedics.  Follow-up with orthopedic office listed above.  Return to ER for new or worsening symptoms. Follow-up with your family physician for repeat BP check within the next week.

## 2025-02-27 ENCOUNTER — OFFICE VISIT (OUTPATIENT)
Dept: OBGYN CLINIC | Facility: CLINIC | Age: 70
End: 2025-02-27
Payer: COMMERCIAL

## 2025-02-27 VITALS — HEIGHT: 63 IN | BODY MASS INDEX: 40.47 KG/M2 | WEIGHT: 228.4 LBS

## 2025-02-27 DIAGNOSIS — S82.392A CLOSED FRACTURE OF POSTERIOR MALLEOLUS OF LEFT TIBIA, INITIAL ENCOUNTER: ICD-10-CM

## 2025-02-27 PROCEDURE — 99203 OFFICE O/P NEW LOW 30 MIN: CPT | Performed by: ORTHOPAEDIC SURGERY

## 2025-02-27 NOTE — PROGRESS NOTES
Name: Elena Bhardwaj      : 1955       MRN: [id number]   Encounter Provider: Carlos Lowry MD   Encounter Date: 25  Encounter department: [facility] [place]         Assessment & Plan  Closed fracture of posterior malleolus of left tibia, initial encounter    Orders:    Ambulatory Referral to Orthopedic Surgery         Plan:   X-rays of the left ankle and left tib/fib, taken on 2025, were reviewed and discussed with the patient during today's visit  Discussed that natural course of posterior malleolus fractures and the course of healing for this type of fracture  Weightbearing status - weightbearing as tolerated in cam walker boot   Encouraged patient to continue use of cam walker boot at this time. Boot should be worn at all types except when sleeping or for hygiene purposes.   Recommended patient to continue use of OTC analgesics as needed for symptomatic management.     To Do Next Visit:  Patient is to return for re-evaluation and repeat x-rays in 2 weeks.   Considered referral to physical therapy at time of next visit    _____________________________________________________  CHIEF COMPLAINT:  Chief Complaint   Patient presents with    Left Ankle - Pain         SUBJECTIVE:  Elena Bhardwaj is a 69 y.o. female who presents for initial evaluation of her left ankle. Patient reports that the her pain began following a mechanical fall on Tuesday, 2025. Patient was seen and evaluated in the [place] ED immediately following the injury, where x-rays of the left ankle and tib/fib were performed and she was provide a cam walker boot and rolling walker. Pain is sharp and throbbing in character. Patient reports that her pain is mostly located along the anterior and lateral aspects of the left ankle.  Denies any pain at the posterior aspect of the shin. Denies numbness or tingling into the left foot.       PAST MEDICAL HISTORY:  Past Medical History:   Diagnosis Date    Cancer  (HCC)     Diabetes mellitus (HCC)     Dizziness     Hypertension     Sleep apnea     Sleep difficulties     Stroke (HCC)        PAST SURGICAL HISTORY:  Past Surgical History:   Procedure Laterality Date    TONSILLECTOMY         FAMILY HISTORY:  Family History   Problem Relation Age of Onset    Stroke Paternal Grandmother         death 70    Stroke Maternal Aunt         death 66       SOCIAL HISTORY:  Social History     Tobacco Use    Smoking status: Never    Smokeless tobacco: Never   Substance Use Topics    Alcohol use: Never    Drug use: Never       MEDICATIONS:    Current Outpatient Medications:     apixaban (ELIQUIS) 5 mg, Take 5 mg by mouth 2 (two) times a day, Disp: , Rfl:     atorvastatin (LIPITOR) 10 mg tablet, Take 10 mg by mouth daily at bedtime Pt only take 5 mg cuts pill in half, Disp: , Rfl:     Cholecalciferol (Vitamin D-3) 25 MCG (1000 UT) CAPS, Take 1,000 Units by mouth 2 (two) times a day, Disp: , Rfl:     metFORMIN (GLUCOPHAGE) 500 mg tablet, Take 500 mg by mouth 2 (two) times a day with meals, Disp: , Rfl:     Multiple Vitamins-Minerals (Multi For Her 50+) TABS, , Disp: , Rfl:     losartan (COZAAR) 25 mg tablet, Take 1 tablet (25 mg total) by mouth daily Do not start before March 9, 2024. (Patient taking differently: Take 25 mg by mouth daily Patient take 1/2 tablet daily), Disp: 30 tablet, Rfl: 0    sotalol (BETAPACE) 80 mg tablet, Take 40 mg by mouth every 12 (twelve) hours Pt takes 40 mg twice a day, Disp: , Rfl:     vitamin A 2400 MCG (8000 UT) capsule, Take 8,000 Units by mouth daily (Patient not taking: Reported on 3/19/2024), Disp: , Rfl:     ALLERGIES:  Allergies   Allergen Reactions    [place] (Diagnostic) - Food Allergy Shortness Of Breath     Reports SOB happened about 30 years ago.    Lisinopril Cough     Gets a chronic cough and SOB.    Semaglutide Dizziness     dizziness    Clindamycin Rash       LABS:  HgA1c:   Lab Results   Component Value Date    HGBA1C 6.7 (H) 12/04/2024     BMP:  "  Lab Results   Component Value Date    CALCIUM 10 12/04/2024    K 4.1 12/04/2024    CO2 29 12/04/2024     12/04/2024    BUN 16 12/04/2024    CREATININE 0.84 12/04/2024     CBC: No components found for: \"CBC\"    _____________________________________________________  PHYSICAL EXAMINATION:  Vital signs: Ht 5' 3\" (1.6 m)   Wt 104 kg (228 lb 6.4 oz)   BMI 40.46 kg/m²   General: No acute distress, awake and alert  Psychiatric: Mood and affect appear appropriate  HEENT: Trachea Midline, No torticollis, no apparent facial trauma  Cardiovascular: No audible murmurs; Extremities appear perfused  Pulmonary: No audible wheezing or stridor  Skin: No open lesions; see further details (if any) below    MUSCULOSKELETAL EXAMINATION:  Extremities:  Left lower extremity   Skin: Clean, dry, and intact. Moderate swelling noted about the left ankle. No erythema or ecchymosis appreciated. No open wounds appreciated.    ROM: limited at the left ankle secondary to pain and swelling   TTP over ATIFL and deltoid.   + Syndesmotic squeeze  Sensation intact to light touch throughout the left lower extremity  Motor function intact to +FHL/+EHL and ankle dorsi/plantarflexion       _____________________________________________________  STUDIES REVIEWED:  I personally reviewed the images obtained in office today and my independent interpretation is as follows:  X-rays, left ankle, 2/26/2025: nondisplaced posterior malleolus fracture, ankle mortise well aligned  X-rays, left tib/fib, 2/26/2025: no fibula or tibia proximal fractures seen      PROCEDURES PERFORMED:  Procedures  None      Scribe Attestation      I,:  Linsey Khan PA-C am acting as a scribe while in the presence of the attending physician.:       I,:  Carlos Lowry MD personally performed the services described in this documentation    as scribed in my presence.:             "

## 2025-03-04 NOTE — ED PROVIDER NOTES
Time reflects when diagnosis was documented in both MDM as applicable and the Disposition within this note       Time User Action Codes Description Comment    2/26/2025  1:30 AM ChantelleWilla Add [W19.XXXA] Fall     2/26/2025  1:56 AM Shazia Evans Add [S93.402A] Left ankle sprain     2/26/2025  1:56 AM Shazia Evans Add [W19.XXXA] Fall, initial encounter     2/26/2025  1:59 AM Shazia Evans Add [R03.0] Elevated blood pressure reading           ED Disposition       ED Disposition   Discharge    Condition   Stable    Date/Time   Wed Feb 26, 2025  1:56 AM    Comment   Elena Bhardwaj discharge to home/self care.                   Assessment & Plan       Medical Decision Making  69y F p/w ankle pain after a fall    Denies any additional injuries    Ddx includes sprain, fracture, contusion    Plan: XR    No grossly evident fracture by my read, given pain will place patient in cam boot for support, trialed crutches however pt did not tolerate so given walker instead. No fibular head fracture. Instructed to f/u w/ ortho, return precautions given, instructions to f/u w/ PCP for repeat BP check. PT d/c home in good condition.    Amount and/or Complexity of Data Reviewed  Radiology: ordered and independent interpretation performed.    Risk  OTC drugs.             Medications   acetaminophen (TYLENOL) tablet 975 mg (975 mg Oral Given 2/26/25 0132)       ED Risk Strat Scores                                                History of Present Illness       Chief Complaint   Patient presents with    Ankle Pain     Fell aoutside while walking dog and slipped on ice, swelling noted, ice pack applied.       Past Medical History:   Diagnosis Date    Cancer (HCC)     Diabetes mellitus (HCC)     Dizziness     Hypertension     Sleep apnea     Sleep difficulties     Stroke (HCC)       Past Surgical History:   Procedure Laterality Date    TONSILLECTOMY        Family History   Problem Relation Age of Onset    Stroke Paternal  Grandmother         death 70    Stroke Maternal Aunt         death 66      Social History     Tobacco Use    Smoking status: Never    Smokeless tobacco: Never   Substance Use Topics    Alcohol use: Never    Drug use: Never      E-Cigarette/Vaping      E-Cigarette/Vaping Substances      I have reviewed and agree with the history as documented.     Patient is a 69y F presenting to the [place] ED for evaluation of fall.  Patient reports she was walking outside when she slipped on some ice, injuring her left ankle.  She denies any additional injury, denies hitting her head.  Denies any chest pain, back pain, abdominal pain.  She notes the pain is isolated to her left ankle.  She denies any right lower extremity discomfort.        Review of Systems   Constitutional:  Negative for chills and fever.   HENT:  Negative for ear pain and sore throat.    Eyes:  Negative for pain and visual disturbance.   Respiratory:  Negative for cough and shortness of breath.    Cardiovascular:  Negative for chest pain and palpitations.   Gastrointestinal:  Negative for abdominal pain and vomiting.   Genitourinary:  Negative for dysuria and hematuria.   Musculoskeletal:  Negative for arthralgias and back pain.        L ankle pain   Skin:  Negative for color change and rash.   Neurological:  Negative for seizures and syncope.   All other systems reviewed and are negative.          Objective       ED Triage Vitals [02/26/25 0011]   Temperature Pulse Blood Pressure Respirations SpO2 Patient Position - Orthostatic VS   97.8 °F (36.6 °C) 71 (!) 186/86 17 99 % Sitting      Temp Source Heart Rate Source BP Location FiO2 (%) Pain Score    Oral Monitor Right arm -- --      Vitals      Date and Time Temp Pulse SpO2 Resp BP Pain Score FACES Pain Rating User   02/26/25 0011 97.8 °F (36.6 °C) 71 99 % 17 186/86 -- -- KL            Physical Exam  Vitals and nursing note reviewed.   Constitutional:       General: She is not in acute distress.     Appearance:  Normal appearance. She is not ill-appearing, toxic-appearing or diaphoretic.   HENT:      Head: Normocephalic and atraumatic.   Eyes:      General: No scleral icterus.        Right eye: No discharge.         Left eye: No discharge.      Extraocular Movements: Extraocular movements intact.      Conjunctiva/sclera: Conjunctivae normal.   Neck:      Comments: Normal active ROM of neck  Cardiovascular:      Rate and Rhythm: Normal rate.      Pulses: Normal pulses.   Pulmonary:      Effort: Pulmonary effort is normal. No respiratory distress.   Abdominal:      General: Abdomen is flat.      Palpations: Abdomen is soft.      Tenderness: There is no abdominal tenderness.   Musculoskeletal:         General: Normal range of motion.      Cervical back: Normal range of motion. No rigidity.      Comments: Pain to palpation of L ATFL region just inferior to lateral malleolus, minimal malleolar pain, minimal fibular head pain. No knee pain on exam, nl ROM of L knee. 2+ DP, normal color of LLE.   Skin:     General: Skin is warm and dry.      Coloration: Skin is not jaundiced.      Findings: No bruising or lesion.   Neurological:      General: No focal deficit present.      Mental Status: She is alert and oriented to person, place, and time. Mental status is at baseline.   Psychiatric:         Mood and Affect: Mood normal.         Behavior: Behavior normal.         Thought Content: Thought content normal.         Judgment: Judgment normal.         Results Reviewed       None            XR tibia fibula 2 vw left   ED Interpretation by Shazia Evans DO (02/26 5993)   Potential medial malleolus avulsion fracture, potential talus avulsion fracture        Final Interpretation by Ivan Butler MD (02/26 0330)   Suspected nondisplaced posterior malleolus fracture.      The study was marked in EPIC for immediate notification.            Computerized Assisted Algorithm (CAA) may have been used to analyze all applicable images.                Workstation performed: [id number]         XR ankle 3+ vw left   ED Interpretation by Shazia Evans DO (02/26 0155)   Potential medial malleolus avulsion fracture, potential talus avulsion fracture      Final Interpretation by Ivan Butler MD (02/26 0806)   Suspected posterior malleolus fracture appreciated on lateral radiograph, nondisplaced without additional fracture identified.      Computerized Assisted Algorithm (CAA) may have been used to analyze all applicable images.               Workstation performed: [id number]             Procedures    ED Medication and Procedure Management   Prior to Admission Medications   Prescriptions Last Dose Informant Patient Reported? Taking?   Cholecalciferol (Vitamin D-3) 25 MCG (1000 UT) CAPS  Self Yes No   Sig: Take 1,000 Units by mouth 2 (two) times a day   Multiple Vitamins-Minerals (Multi For Her 50+) TABS  Self Yes No   apixaban (ELIQUIS) 5 mg  Self Yes No   Sig: Take 5 mg by mouth 2 (two) times a day   atorvastatin (LIPITOR) 10 mg tablet  Self Yes No   Sig: Take 10 mg by mouth daily at bedtime Pt only take 5 mg cuts pill in half   losartan (COZAAR) 25 mg tablet   No No   Sig: Take 1 tablet (25 mg total) by mouth daily Do not start before March 9, 2024.   Patient taking differently: Take 25 mg by mouth daily Patient take 1/2 tablet daily   metFORMIN (GLUCOPHAGE) 500 mg tablet  Self Yes No   Sig: Take 500 mg by mouth 2 (two) times a day with meals   sotalol (BETAPACE) 80 mg tablet  Self Yes No   Sig: Take 40 mg by mouth every 12 (twelve) hours Pt takes 40 mg twice a day   vitamin A 2400 MCG (8000 UT) capsule  Self Yes No   Sig: Take 8,000 Units by mouth daily   Patient not taking: Reported on 3/19/2024      Facility-Administered Medications: None     Discharge Medication List as of 2/26/2025  2:00 AM        CONTINUE these medications which have NOT CHANGED    Details   apixaban (ELIQUIS) 5 mg Take 5 mg by mouth 2 (two) times a day, Starting  Mon 10/16/2023, Historical Med      atorvastatin (LIPITOR) 10 mg tablet Take 10 mg by mouth daily at bedtime Pt only take 5 mg cuts pill in half, Historical Med      Cholecalciferol (Vitamin D-3) 25 MCG (1000 UT) CAPS Take 1,000 Units by mouth 2 (two) times a day, Historical Med      losartan (COZAAR) 25 mg tablet Take 1 tablet (25 mg total) by mouth daily Do not start before March 9, 2024., Starting Sat 3/9/2024, Until u 9/12/2024, Normal      metFORMIN (GLUCOPHAGE) 500 mg tablet Take 500 mg by mouth 2 (two) times a day with meals, Starting Tue 12/5/2023, Historical Med      Multiple Vitamins-Minerals (Multi For Her 50+) TABS Historical Med      sotalol (BETAPACE) 80 mg tablet Take 40 mg by mouth every 12 (twelve) hours Pt takes 40 mg twice a day, Starting Mon 11/20/2023, Until Tue 11/19/2024, Historical Med      vitamin A 2400 MCG (8000 UT) capsule Take 8,000 Units by mouth daily, Starting u 12/28/2023, Historical Med             ED SEPSIS DOCUMENTATION   Time reflects when diagnosis was documented in both MDM as applicable and the Disposition within this note       Time User Action Codes Description Comment    2/26/2025  1:30 AM Willa Lockhart Add [W19.XXXA] Fall     2/26/2025  1:56 AM Shazia Evans [S93.402A] Left ankle sprain     2/26/2025  1:56 AM Shazia Evans Add [W19.XXXA] Fall, initial encounter     2/26/2025  1:59 AM Shazia Evans [R03.0] Elevated blood pressure reading                  Shazia Evans, DO  03/07/25 1243

## 2025-03-08 DIAGNOSIS — S82.392A CLOSED FRACTURE OF POSTERIOR MALLEOLUS OF LEFT TIBIA, INITIAL ENCOUNTER: Primary | ICD-10-CM

## 2025-03-13 ENCOUNTER — OFFICE VISIT (OUTPATIENT)
Dept: OBGYN CLINIC | Facility: CLINIC | Age: 70
End: 2025-03-13
Payer: COMMERCIAL

## 2025-03-13 ENCOUNTER — APPOINTMENT (OUTPATIENT)
Dept: RADIOLOGY | Facility: CLINIC | Age: 70
End: 2025-03-13
Payer: COMMERCIAL

## 2025-03-13 VITALS — BODY MASS INDEX: 40.4 KG/M2 | HEIGHT: 63 IN | WEIGHT: 228 LBS

## 2025-03-13 DIAGNOSIS — S82.392A CLOSED FRACTURE OF POSTERIOR MALLEOLUS OF LEFT TIBIA, INITIAL ENCOUNTER: ICD-10-CM

## 2025-03-13 DIAGNOSIS — S82.392A CLOSED FRACTURE OF POSTERIOR MALLEOLUS OF LEFT TIBIA, INITIAL ENCOUNTER: Primary | ICD-10-CM

## 2025-03-13 PROCEDURE — 73610 X-RAY EXAM OF ANKLE: CPT

## 2025-03-13 PROCEDURE — 99213 OFFICE O/P EST LOW 20 MIN: CPT | Performed by: ORTHOPAEDIC SURGERY

## 2025-03-13 NOTE — PATIENT INSTRUCTIONS
Continue weightbearing as tolerated in cam boot. Can try to transition to sneaker in 2 weeks  Begin PT  Recommend vit D and calcium supplementation

## 2025-03-13 NOTE — PROGRESS NOTES
Name: Elena Bhardwaj      : 1955       MRN: [id number]   Encounter Provider: Carlos Lowry MD   Encounter Date: 25  Encounter department: [facility] [place]         Assessment & Plan  Closed fracture of posterior malleolus of left tibia, initial encounter   Patient sustained a left posterior malleolus fracture s/p fall 2 weeks ago 2025  Repeat XRs today demonstrate stable anatomic alignment and adequate interval healing  Continue WBAT LLE in cam boot for 2 more weeks, then may transition to sneaker as tolerated   Begin PT  Recommend vit D and calcium supplementation   OTC pain medication as needed, rest, elevation, compression stocking for pain and swelling control   F/u 4 weeks  To Do Next Visit: re-evaluate ankle   Orders:    Ambulatory Referral to Physical Therapy; Future      _________________________________  CHIEF COMPLAINT:  Chief Complaint   Patient presents with    Left Ankle - Follow-up         SUBJECTIVE:  Elena Bhardwaj is a 69 y.o. female who presents for follow up of left ankle posterior malleolus fracture. She has been WBAT in cam boot. States pain is overall well-controlled, takes tylenol as needed for pain and ices as needed. Denies numbness/tingling. Residual pain is lateral ankle and described as intermittent and sharp if she bumps it.    PAST MEDICAL HISTORY:  Past Medical History:   Diagnosis Date    Cancer (HCC)     Diabetes mellitus (HCC)     Dizziness     Hypertension     Sleep apnea     Sleep difficulties     Stroke (HCC)        PAST SURGICAL HISTORY:  Past Surgical History:   Procedure Laterality Date    TONSILLECTOMY         FAMILY HISTORY:  Family History   Problem Relation Age of Onset    Stroke Paternal Grandmother         death 70    Stroke Maternal Aunt         death 66       SOCIAL HISTORY:  Social History     Tobacco Use    Smoking status: Never    Smokeless tobacco: Never   Substance Use Topics    Alcohol use: Never    Drug use:  "Never       MEDICATIONS:    Current Outpatient Medications:     apixaban (ELIQUIS) 5 mg, Take 5 mg by mouth 2 (two) times a day, Disp: , Rfl:     atorvastatin (LIPITOR) 10 mg tablet, Take 10 mg by mouth daily at bedtime Pt only take 5 mg cuts pill in half, Disp: , Rfl:     Cholecalciferol (Vitamin D-3) 25 MCG (1000 UT) CAPS, Take 1,000 Units by mouth 2 (two) times a day, Disp: , Rfl:     metFORMIN (GLUCOPHAGE) 500 mg tablet, Take 500 mg by mouth 2 (two) times a day with meals, Disp: , Rfl:     Multiple Vitamins-Minerals (Multi For Her 50+) TABS, , Disp: , Rfl:     losartan (COZAAR) 25 mg tablet, Take 1 tablet (25 mg total) by mouth daily Do not start before March 9, 2024. (Patient taking differently: Take 25 mg by mouth daily Patient take 1/2 tablet daily), Disp: 30 tablet, Rfl: 0    sotalol (BETAPACE) 80 mg tablet, Take 40 mg by mouth every 12 (twelve) hours Pt takes 40 mg twice a day, Disp: , Rfl:     vitamin A 2400 MCG (8000 UT) capsule, Take 8,000 Units by mouth daily (Patient not taking: Reported on 3/19/2024), Disp: , Rfl:     ALLERGIES:  Allergies   Allergen Reactions    Eunice (Diagnostic) - Food Allergy Shortness Of Breath     Reports SOB happened about 30 years ago.    Lisinopril Cough     Gets a chronic cough and SOB.    Semaglutide Dizziness     dizziness    Clindamycin Rash       LABS:  HgA1c:   Lab Results   Component Value Date    HGBA1C 6.7 (H) 12/04/2024     BMP:   Lab Results   Component Value Date    CALCIUM 10 12/04/2024    K 4.1 12/04/2024    CO2 29 12/04/2024     12/04/2024    BUN 16 12/04/2024    CREATININE 0.84 12/04/2024     CBC: No components found for: \"CBC\"    _____________________________________________________  PHYSICAL EXAMINATION:  Vital signs: Ht 5' 3\" (1.6 m)   Wt 103 kg (228 lb)   BMI 40.39 kg/m²   General: No acute distress, awake and alert  Psychiatric: Mood and affect appear appropriate  HEENT: Trachea Midline, No torticollis, no apparent facial trauma  Cardiovascular: " No audible murmurs; Extremities appear perfused  Pulmonary: No audible wheezing or stridor  Skin: No open lesions; see further details (if any) below    MUSCULOSKELETAL EXAMINATION:  Extremities:  Left Ankle  Anatomical deformity no   Skin is intact.   There is mild swelling present globally of the ankle.   There is no ecchymosis present over the ankle.   There is mild tenderness present over the lateral malleolus.   Active range of motion: full dorsiflexion and plantarflexion, slightly limited inversion/eversion   There is full range of motion of toes in plantar flexion and dorsiflexion.     There is no pain with resisted motion.    Sensation is intact to light touch superficial peroneal, deep peroneal, tibial, saphenous, and sural nerve distributions.    2+ DP pulse present.     _____________________________________________________  STUDIES REVIEWED:  I personally reviewed the images obtained in office today and my independent interpretation is as follows:  X-rays left ankle: stable anatomic alignment of ankle, adequate interval healing of posterior malleolus fracture     PROCEDURES PERFORMED: none    Nidia Lane PA-C - assisting  Carlos Lowry MD

## 2025-03-13 NOTE — ASSESSMENT & PLAN NOTE
Patient sustained a left posterior malleolus fracture s/p fall 2 weeks ago 2/25/2025  Repeat XRs today demonstrate stable anatomic alignment and adequate interval healing  Continue WBAT LLE in cam boot for 2 more weeks, then may transition to sneaker as tolerated   Begin PT  Recommend vit D and calcium supplementation   OTC pain medication as needed, rest, elevation, compression stocking for pain and swelling control   F/u 4 weeks  To Do Next Visit: re-evaluate ankle   Orders:    Ambulatory Referral to Physical Therapy; Future

## 2025-03-17 ENCOUNTER — EVALUATION (OUTPATIENT)
Dept: PHYSICAL THERAPY | Facility: CLINIC | Age: 70
End: 2025-03-17
Payer: COMMERCIAL

## 2025-03-17 DIAGNOSIS — S82.392D CLOSED FRACTURE OF POSTERIOR MALLEOLUS OF LEFT TIBIA WITH ROUTINE HEALING, SUBSEQUENT ENCOUNTER: Primary | ICD-10-CM

## 2025-03-17 PROCEDURE — 97112 NEUROMUSCULAR REEDUCATION: CPT

## 2025-03-17 PROCEDURE — 97110 THERAPEUTIC EXERCISES: CPT

## 2025-03-17 PROCEDURE — 97161 PT EVAL LOW COMPLEX 20 MIN: CPT

## 2025-03-17 NOTE — PROGRESS NOTES
PT Evaluation     Today's date: 3/17/2025  Patient name: Elena Bhardwaj  : 1955  MRN: [id number]  Referring provider: Nidia Rothman P*  Dx:   Encounter Diagnosis     ICD-10-CM    1. Closed fracture of posterior malleolus of left tibia with routine healing, subsequent encounter  S82.392D Ambulatory Referral to Physical Therapy          Start Time: 930  Stop Time: 1015  Total time in clinic (min): 45 minutes    Assessment  Impairments: abnormal gait, abnormal muscle tone, abnormal or restricted ROM, activity intolerance, impaired balance, impaired physical strength, lacks appropriate home exercise program, pain with function and weight-bearing intolerance    Assessment details: Patient is a 68 y/o female reporting to physical therapy with L ankle pain s/p hairline posterior lateral malleolar fx. Upon examination, patient demonstrates impairments of increased pain, increased edema, decreased ROM and decreased strength which are resulting in functional limitations of her performance of ADL's/self-care and household activities. PT plan of care will focus on pain reduction, ROM, and strengthening to improve her impairments to be able to return to her prior level of function. Patient is a good candidate for and would benefit from skilled physical therapy to improve above listed impairments to facilitate a return to her prior level of function.     Understanding of Dx/Px/POC: good     Prognosis: good    Goals  ST weeks  1. Patient's subjective reports pain levels at worst will decrease by at least 2 levels.  2. Patient will become independent with her HEP.     LT weeks  1. Patient's L ankle ROM will improve to WFL and strength will improve to at least a 4+/5 for improved gait and tolerance to ambulation on uneven surfaces.  2. Patient's FOTO score will improve from a 44 obtained on initial evaluation to a 63 or better indicating improvements in her performance of functional activities.     Plan  Patient  would benefit from: skilled physical therapy and PT eval  Planned modality interventions: cryotherapy, thermotherapy: hydrocollator packs, unattended electrical stimulation and low level laser therapy    Planned therapy interventions: IASTM, joint mobilization, kinesiology taping, manual therapy, neuromuscular re-education, nerve gliding, patient/caregiver education, strengthening, stretching, therapeutic activities, therapeutic exercise, home exercise program, gait training, functional ROM exercises, flexibility, activity modification and balance/weight bearing training    Frequency: 2x week  Duration in weeks: 8  Plan of Care beginning date: 3/17/2025  Plan of Care expiration date: 2025  Treatment plan discussed with: patient        Subjective Evaluation    History of Present Illness  Mechanism of injury: Patient is a 70 y/o female reporting to PT with complaints of L ankle pain s/p L posterior lateral malleolar hairline fx. She notes her initial injury occurred when she fell while walking her dog outside. She went to the hospital and has been ambulating with a CAM boot on her L LE since. She notes difficulty with cleaning, stair negotiation, ambulation on uneven surfaces, and dressing. She uses an ice pack and Tylenol prn for pain control which she notes has been helping. She has a follow up with Dr. Lowry on 3/13 and she is able to begin transitioning to use of a sneaker on 3/27. Patient reports her goals for PT are to get rid of her CAM boot and be able to walk normally and pain free.   Quality of life: good    Patient Goals  Patient goals for therapy: decreased pain, decreased edema, improved balance, increased motion, increased strength, independence with ADLs/IADLs and return to sport/leisure activities    Pain  Current pain ratin  At best pain ratin  At worst pain ratin  Location: L lateral ankle  Relieving factors: ice and rest          Objective     Tenderness   Left Ankle/Foot    Tenderness in the anterior ankle, lateral malleolus and medial malleolus.     Active Range of Motion   Left Ankle/Foot   Dorsiflexion (ke): 15 degrees   Plantar flexion: 2 degrees with pain  Inversion: 10 degrees with pain  Eversion: 6 degrees with pain    Strength/Myotome Testing     Left Hip   Planes of Motion   Flexion: 4+  Abduction: 4+  Adduction: 4+    Right Hip   Planes of Motion   Flexion: 4+  Abduction: 4+  Adduction: 4+    Left Knee   Flexion: 4+  Extension: 4+    Right Knee   Flexion: 4+  Extension: 4+    Left Ankle/Foot   Dorsiflexion: 3+  Plantar flexion: 3+    Right Ankle/Foot   Dorsiflexion: 5  Plantar flexion: 5    Swelling   Left Ankle/Foot   Metatarsal heads: 24 cm  Figure 8: 58 cm  Malleoli: 31 cm    Ambulation   Weight-Bearing Status   Weight-Bearing Status (Left): weight-bearing as tolerated   Assistive device used: none    Additional Weight-Bearing Status Details  In CAM boot     Observational Gait   Gait: antalgic   Decreased walking speed, stride length, left stance time, left swing time and left step length.   Base of support: decreased             Precautions: Hx DM, A Fib    POC expires Unit limit Auth Expiration date PT/OT + Visit Limit?   5/12 N/A Pending BOMN                 Visit/Unit Tracking  AUTH Status:  Date 3/17              Pending Used 1               Remaining                  FOTO:       Manuals 3/17                                                                Neuro Re-Ed             Pt education regarding HEP, POC, and dx  5'             Ankle alphabets  X1 round                                                                             Ther Ex             Ankle pumps  3x10            Ankle circles  3x10                                                                                           Ther Activity                                       Gait Training                                       Modalities

## 2025-03-20 ENCOUNTER — OFFICE VISIT (OUTPATIENT)
Dept: PHYSICAL THERAPY | Facility: CLINIC | Age: 70
End: 2025-03-20
Payer: COMMERCIAL

## 2025-03-20 DIAGNOSIS — S82.392D CLOSED FRACTURE OF POSTERIOR MALLEOLUS OF LEFT TIBIA WITH ROUTINE HEALING, SUBSEQUENT ENCOUNTER: Primary | ICD-10-CM

## 2025-03-20 PROCEDURE — 97110 THERAPEUTIC EXERCISES: CPT

## 2025-03-20 PROCEDURE — 97112 NEUROMUSCULAR REEDUCATION: CPT

## 2025-03-20 PROCEDURE — 97140 MANUAL THERAPY 1/> REGIONS: CPT

## 2025-03-20 NOTE — PROGRESS NOTES
Daily Note     Today's date: 3/20/2025  Patient name: Elena Bhardwaj  : 1955  MRN: [id number]  Referring provider: Nidia Rothman P*  Dx:   Encounter Diagnosis     ICD-10-CM    1. Closed fracture of posterior malleolus of left tibia with routine healing, subsequent encounter  S82.392D           Start Time: 1100  Stop Time: 1145  Total time in clinic (min): 45 minutes    Subjective: Patient reports she was a little sore following the first session and applied ice to her ankle. She notes she has been really trying to keep her L LE elevated as much as possible to reduce her swelling.       Objective: See treatment diary below      Assessment: Tolerated treatment well. Patient demonstrated fatigue post treatment, exhibited good technique with therapeutic exercises, and would benefit from continued PT. Patient provided good effort during performance of exercises. Added LAQ and seated marches to improve LE strength to which patient noted increased challenge, but responded well. Visual and verbal cues provided to ensure correct form during performance of new exercises. Noted improved swelling in L ankle this session. Will assess patient tolerance and progress next visit, as able.         Plan: Continue per plan of care.      Precautions: Hx DM, A Fib    POC expires Unit limit Auth Expiration date PT/OT + Visit Limit?    N/A  BOMN                 Visit/Unit Tracking  AUTH Status:  Date 3/17 3/20             16 visits  Used 1 2              Remaining  15 14               FOTO:       Manuals 3/17 3/20            L ankle PROM  TB all dir                                                  Neuro Re-Ed             Pt education regarding HEP, POC, and dx  5'  5'           Ankle alphabets  X1 round X2 round           LAQ  2x10                                                               Ther Ex             Ankle pumps  3x10 3x10            Ankle circles  3x10  3x10            Seated marches   3x10 L LE                                                                              Ther Activity                                       Gait Training                                       Modalities

## 2025-03-24 ENCOUNTER — OFFICE VISIT (OUTPATIENT)
Dept: PHYSICAL THERAPY | Facility: CLINIC | Age: 70
End: 2025-03-24
Payer: COMMERCIAL

## 2025-03-24 DIAGNOSIS — S82.392D CLOSED FRACTURE OF POSTERIOR MALLEOLUS OF LEFT TIBIA WITH ROUTINE HEALING, SUBSEQUENT ENCOUNTER: Primary | ICD-10-CM

## 2025-03-24 PROCEDURE — 97110 THERAPEUTIC EXERCISES: CPT

## 2025-03-24 PROCEDURE — 97140 MANUAL THERAPY 1/> REGIONS: CPT

## 2025-03-24 PROCEDURE — 97112 NEUROMUSCULAR REEDUCATION: CPT

## 2025-03-24 NOTE — PROGRESS NOTES
Daily Note     Today's date: 3/24/2025  Patient name: Elena Bhardwaj  : 1955  MRN: [id number]  Referring provider: Nidia Rothmna P*  Dx:   Encounter Diagnosis     ICD-10-CM    1. Closed fracture of posterior malleolus of left tibia with routine healing, subsequent encounter  S82.392D           Start Time: 930  Stop Time: 1015  Total time in clinic (min): 45 minutes    Subjective: Patient reports she had some increased swelling yesterday and this morning in her ankle.       Objective: See treatment diary below      Assessment: Tolerated treatment well. Patient demonstrated fatigue post treatment, exhibited good technique with therapeutic exercises, and would benefit from continued PT. Continues to provide good effort during performance of exercises. Minimal verbal cues needed to ensure correct form during performance of exercises. Will continue to assess patient tolerance and progress next visit, as able.         Plan: Continue per plan of care.      Precautions: Hx DM, A Fib    POC expires Unit limit Auth Expiration date PT/OT + Visit Limit?    N/A  BOMN                 Visit/Unit Tracking  AUTH Status:  Date 3/17 3/20 3/24            16 visits  Used 1 2 3             Remaining  15 14 13              FOTO:       Manuals 3/17 3/20  3/24          L ankle PROM  TB all dir           Retrograde massage L ankle/calf   TB                                    Neuro Re-Ed             Pt education regarding HEP, POC, and dx  5'  5' 5'          Ankle alphabets  X1 round X2 round X2 round           LAQ  2x10 2x10                                                              Ther Ex             Ankle pumps  3x10 3x10  3x10          Ankle circles  3x10  3x10  3x10          Seated marches   3x10 L LE  3x10 L LE                                                                            Ther Activity                                       Gait Training                                       Modalities

## 2025-03-27 ENCOUNTER — OFFICE VISIT (OUTPATIENT)
Dept: PHYSICAL THERAPY | Facility: CLINIC | Age: 70
End: 2025-03-27
Payer: COMMERCIAL

## 2025-03-27 DIAGNOSIS — S82.392D CLOSED FRACTURE OF POSTERIOR MALLEOLUS OF LEFT TIBIA WITH ROUTINE HEALING, SUBSEQUENT ENCOUNTER: Primary | ICD-10-CM

## 2025-03-27 PROCEDURE — 97110 THERAPEUTIC EXERCISES: CPT

## 2025-03-27 PROCEDURE — 97140 MANUAL THERAPY 1/> REGIONS: CPT

## 2025-03-27 PROCEDURE — 97112 NEUROMUSCULAR REEDUCATION: CPT

## 2025-03-27 NOTE — PROGRESS NOTES
Daily Note     Today's date: 3/27/2025  Patient name: Elena Bhardwaj  : 1955  MRN: [id number]  Referring provider: Nidia Rothman P*  Dx:   Encounter Diagnosis     ICD-10-CM    1. Closed fracture of posterior malleolus of left tibia with routine healing, subsequent encounter  S82.392D           Start Time: 1100  Stop Time: 1145  Total time in clinic (min): 45 minutes    Subjective: Patient reports she had some increased pain and swelling in her L ankle this morning that resolved with elevation and ice. She notes she slept well.       Objective: See treatment diary below      Assessment: Tolerated treatment well. Patient demonstrated fatigue post treatment, exhibited good technique with therapeutic exercises, and would benefit from continued PT. Continues to provide good effort during performance of exercises. Patient tolerated ambulation in sneaker well this session with slightly antalgic gait pattern. Added rec bike, standing HR, standing weight shifting, step ups, wobble board, short foots, and seated plantar fascia stretch to which patient responded well and with minimal pain. Visual and verbal cues provided to ensure correct form during performance of new exercises. Update HEP was provided this session. Will continue to assess patient tolerance and progress next visit, as able.         Plan: Continue per plan of care.      Precautions: Hx DM, A Fib    POC expires Unit limit Auth Expiration date PT/OT + Visit Limit?    N/A  BOMN                 Visit/Unit Tracking  AUTH Status:  Date 3/17 3/20 3/24 3/27           16 visits  Used 1 2 3 4            Remaining  15 14 13 12             FOTO:       Manuals 3/17 3/20  3/24 3/27         L ankle PROM  TB all dir           Retrograde massage L ankle/calf   TB                                    Neuro Re-Ed             Pt education regarding HEP, POC, and dx  5'  5' 5' 5'         Ankle alphabets  X1 round X2 round X2 round  HEP         LAQ  2x10 2x10 HEP   "       Standing weight shifts     3x10         Standing HR    3x10                                    Ther Ex             Ankle pumps  3x10 3x10  3x10 HEP         Ankle circles  3x10  3x10  3x10 HEP         Seated marches   3x10 L LE  3x10 L LE  HEP         Wobble board DF/PF, Inv/ev, circles    X20 ea          Short foots     X20 3\"          Seated plantar fascia stretch     10\" x 10 L                       Rec bike for L ankle ROM and strength     5'          Ther Activity             Step ups     2x10 4\"                       Gait Training                                       Modalities                                                "

## 2025-03-30 NOTE — PROGRESS NOTES
Daily Note     Today's date: 3/31/2025  Patient name: Elena Bhardwaj  : 1955  MRN: [id number]  Referring provider: Nidia Rothman P*  Dx:   Encounter Diagnosis     ICD-10-CM    1. Closed fracture of posterior malleolus of left tibia with routine healing, subsequent encounter  S82.392D           Start Time: 930  Stop Time: 1015  Total time in clinic (min): 45 minutes    Subjective: Patient reports she has been using her sneaker full time without pain. She does have some increased L LE swelling. She notes she will start to wear compression socks once she gets home today.       Objective: See treatment diary below      Assessment: Tolerated treatment well. Patient demonstrated fatigue post treatment, exhibited good technique with therapeutic exercises, and would benefit from continued PT. Continues to provide good effort during performance of exercises. Added TB eversion and inversion, long sitting calf stretch, and step down this session to which patient responded well and without complaints of increased pain. Visual and verbal cues provided to ensure correct form during performance of new exercises. Updated HEP was provided at this session. Will continue to assess patient tolerance and progress next visit, as able.         Plan: Continue per plan of care.      Precautions: Hx DM, A Fib    POC expires Unit limit Auth Expiration date PT/OT + Visit Limit?    N/A  BOMN                 Visit/Unit Tracking  AUTH Status:  Date 3/17 3/20 3/24 3/27 3/31          16 visits  Used 1 2 3 4 5           Remaining  15 14 13 12 11            FOTO:       Manuals 3/17 3/20  3/24 3/27 3/31        L ankle PROM  TB all dir           Retrograde massage L ankle/calf   TB  TB                                  Neuro Re-Ed             Pt education regarding HEP, POC, and dx  5'  5' 5' 5' 5'        Ankle alphabets  X1 round X2 round X2 round  HEP today X2 rounds        LAQ  2x10 2x10 HEP         Standing weight shifts      "3x10 3x10         Standing HR    3x10  3x10                                   Ther Ex             Ankle pumps  3x10 3x10  3x10 HEP today 3x10         Ankle circles  3x10  3x10  3x10 HEP today 3x10         Seated marches   3x10 L LE  3x10 L LE  HEP         Wobble board DF/PF, Inv/ev, circles    X20 ea  X30 ea         Short foots     X20 3\"  X20 3\"        Seated plantar fascia stretch     10\" x 10 L  10\" x 10 L         Seated TB inv/eversion     2x10 grn         Long sitting calf stretch      10\" x 10 L LE         Rec bike for L ankle ROM and strength     5'  6'         Ther Activity             Step ups     2x10 4\"  2x10 6\"         Step down     2x10 4\"         Gait Training                                       Modalities                                                  "

## 2025-03-31 ENCOUNTER — OFFICE VISIT (OUTPATIENT)
Dept: PHYSICAL THERAPY | Facility: CLINIC | Age: 70
End: 2025-03-31
Payer: COMMERCIAL

## 2025-03-31 DIAGNOSIS — S82.392D CLOSED FRACTURE OF POSTERIOR MALLEOLUS OF LEFT TIBIA WITH ROUTINE HEALING, SUBSEQUENT ENCOUNTER: Primary | ICD-10-CM

## 2025-03-31 PROCEDURE — 97530 THERAPEUTIC ACTIVITIES: CPT

## 2025-03-31 PROCEDURE — 97110 THERAPEUTIC EXERCISES: CPT

## 2025-03-31 PROCEDURE — 97112 NEUROMUSCULAR REEDUCATION: CPT

## 2025-04-03 ENCOUNTER — OFFICE VISIT (OUTPATIENT)
Dept: PHYSICAL THERAPY | Facility: CLINIC | Age: 70
End: 2025-04-03
Payer: COMMERCIAL

## 2025-04-03 DIAGNOSIS — S82.392D CLOSED FRACTURE OF POSTERIOR MALLEOLUS OF LEFT TIBIA WITH ROUTINE HEALING, SUBSEQUENT ENCOUNTER: Primary | ICD-10-CM

## 2025-04-03 PROCEDURE — 97110 THERAPEUTIC EXERCISES: CPT

## 2025-04-03 PROCEDURE — 97112 NEUROMUSCULAR REEDUCATION: CPT

## 2025-04-03 NOTE — PROGRESS NOTES
Daily Note     Today's date: 4/3/2025  Patient name: Elena Bhardwaj  : 1955  MRN: [id number]  Referring provider: Nidia Rothman P*  Dx:   Encounter Diagnosis     ICD-10-CM    1. Closed fracture of posterior malleolus of left tibia with routine healing, subsequent encounter  S82.392D           Start Time: 1100  Stop Time: 1146  Total time in clinic (min): 46 minutes    Subjective: Patient reports some soreness on the dorsal aspect of her L foot. Notes HEP has been going well.       Objective: See treatment diary below      Assessment: Tolerated treatment well. Patient demonstrated fatigue post treatment, exhibited good technique with therapeutic exercises, and would benefit from continued PT. Continues to provide good effort during performance of exercises. Added slantboard calf stretch and TB DF/PF this session to which patient responded well and without pain. Visual and verbal cues provided to ensure correct form during performance of new exercises. Will continue to assess patient tolerance and progress next visit, as able.         Plan: Continue per plan of care.      Precautions: Hx DM, A Fib    POC expires Unit limit Auth Expiration date PT/OT + Visit Limit?    N/A  BOMN                 Visit/Unit Tracking  AUTH Status:  Date 3/17 3/20 3/24 3/27 3/31 4/3         16 visits  Used 1 2 3 4 5 6          Remaining  15 14 13 12 11 10           FOTO:       Manuals 3/17 3/20  3/24 3/27 3/31 4/3       L ankle PROM  TB all dir           Retrograde massage L ankle/calf   TB  TB                                  Neuro Re-Ed             Pt education regarding HEP, POC, and dx  5'  5' 5' 5' 5' 5'       Ankle alphabets  X1 round X2 round X2 round  HEP today X2 rounds        LAQ  2x10 2x10 HEP         Standing weight shifts     3x10 3x10  3x10       Standing HR    3x10  3x10  3x10                                 Ther Ex             Ankle pumps  3x10 3x10  3x10 HEP today 3x10  3x10       Ankle circles  3x10   "3x10  3x10 HEP today 3x10  3x10       Seated marches   3x10 L LE  3x10 L LE  HEP         Wobble board DF/PF, Inv/ev, circles    X20 ea  X30 ea  X30 ea        Short foots     X20 3\"  X20 3\" X20 3\"        Seated plantar fascia stretch     10\" x 10 L  10\" x 10 L  HEP        Seated TB inv/eversion     2x10 grn  2x10 grn        Long sitting calf stretch      10\" x 10 L LE  10\" x 10 L LE        Seated TB DF/PF      3x10 grn        Standing slantboard calf stretch       5\" x 20       Rec bike for L ankle ROM and strength     5'  6'  7'        Ther Activity             Step ups     2x10 4\"  2x10 6\"  2x10 6\"        Step down     2x10 4\"  2x10 4\"        Gait Training                                       Modalities                                                    "

## 2025-04-04 DIAGNOSIS — S82.392A CLOSED FRACTURE OF POSTERIOR MALLEOLUS OF LEFT TIBIA, INITIAL ENCOUNTER: Primary | ICD-10-CM

## 2025-04-07 ENCOUNTER — OFFICE VISIT (OUTPATIENT)
Dept: PHYSICAL THERAPY | Facility: CLINIC | Age: 70
End: 2025-04-07
Payer: COMMERCIAL

## 2025-04-07 DIAGNOSIS — S82.392D CLOSED FRACTURE OF POSTERIOR MALLEOLUS OF LEFT TIBIA WITH ROUTINE HEALING, SUBSEQUENT ENCOUNTER: Primary | ICD-10-CM

## 2025-04-07 PROCEDURE — 97112 NEUROMUSCULAR REEDUCATION: CPT

## 2025-04-07 PROCEDURE — 97110 THERAPEUTIC EXERCISES: CPT

## 2025-04-07 PROCEDURE — 97140 MANUAL THERAPY 1/> REGIONS: CPT

## 2025-04-07 NOTE — PROGRESS NOTES
"Daily Note     Today's date: 2025  Patient name: Elena Bhardwaj  : 1955  MRN: [id number]  Referring provider: Nidia Rothman P*  Dx:   Encounter Diagnosis     ICD-10-CM    1. Closed fracture of posterior malleolus of left tibia with routine healing, subsequent encounter  S82.392D           Start Time: 930  Stop Time: 1015  Total time in clinic (min): 45 minutes    Subjective: Patient reports she has been having some discomfort at night in her lateral L ankle. She notes once she is active and takes a shower he ankle seems to feel better.       Objective: See treatment diary below      Assessment: Tolerated treatment well. Patient demonstrated fatigue post treatment, exhibited good technique with therapeutic exercises, and would benefit from continued PT. Continues to provide good effort during performance of exercises. Added SLS this session to which patient responded well. Visual and verbal cues provided to ensure correct form during performance of new exercise. Will continue to assess patient tolerance and progress next visit, as able.         Plan: Continue per plan of care.      Precautions: Hx DM, A Fib    POC expires Unit limit Auth Expiration date PT/OT + Visit Limit?    N/A  BOMN                 Visit/Unit Tracking  AUTH Status:  Date 3/17 3/20 3/24 3/27 3/31 4/3 4/7        16 visits  Used 1 2 3 4 5 6 7         Remaining  15 14 13 12 11 10 9          FOTO:       Manuals 3/17 3/20  3/24 3/27 3/31 4/3 4/7      L ankle PROM  TB all dir     TB all dir       Retrograde massage L ankle/calf   TB  TB                                  Neuro Re-Ed             Pt education regarding HEP, POC, and dx  5'  5' 5' 5' 5' 5' 5'       Ankle alphabets  X1 round X2 round X2 round  HEP today X2 rounds        LAQ  2x10 2x10 HEP         Standing weight shifts     3x10 3x10  3x10 3x10      Standing HR    3x10  3x10  3x10 3x10       SLS        3x 15\" L LE                    Ther Ex             Ankle pumps  " "3x10 3x10  3x10 HEP today 3x10  3x10 HEP      Ankle circles  3x10  3x10  3x10 HEP today 3x10  3x10 HEP      Seated marches   3x10 L LE  3x10 L LE  HEP         Wobble board DF/PF, Inv/ev, circles    X20 ea  X30 ea  X30 ea  X30 ea       Short foots     X20 3\"  X20 3\" X20 3\"  X20 3\"       Seated plantar fascia stretch     10\" x 10 L  10\" x 10 L  HEP        Seated TB inv/eversion     2x10 grn  2x10 grn  2x10 grn       Long sitting calf stretch      10\" x 10 L LE  10\" x 10 L LE  HEP      Seated TB DF/PF      3x10 grn  3x10 grn       Standing slantboard calf stretch       5\" x 20 5\" x 20       Rec bike for L ankle ROM and strength     5'  6'  7'  6'      Ther Activity             Step ups     2x10 4\"  2x10 6\"  2x10 6\"  2x10 6\"       Step down     2x10 4\"  2x10 4\"  2x10 4\"       Gait Training                                       Modalities                                                      "

## 2025-04-10 ENCOUNTER — OFFICE VISIT (OUTPATIENT)
Dept: OBGYN CLINIC | Facility: CLINIC | Age: 70
End: 2025-04-10
Payer: COMMERCIAL

## 2025-04-10 ENCOUNTER — APPOINTMENT (OUTPATIENT)
Dept: RADIOLOGY | Facility: CLINIC | Age: 70
End: 2025-04-10
Payer: COMMERCIAL

## 2025-04-10 VITALS — WEIGHT: 225.5 LBS | HEIGHT: 63 IN | BODY MASS INDEX: 39.95 KG/M2

## 2025-04-10 DIAGNOSIS — S82.392A CLOSED FRACTURE OF POSTERIOR MALLEOLUS OF LEFT TIBIA, INITIAL ENCOUNTER: ICD-10-CM

## 2025-04-10 DIAGNOSIS — S82.392A CLOSED FRACTURE OF POSTERIOR MALLEOLUS OF LEFT TIBIA, INITIAL ENCOUNTER: Primary | ICD-10-CM

## 2025-04-10 PROCEDURE — 73610 X-RAY EXAM OF ANKLE: CPT

## 2025-04-10 PROCEDURE — 99213 OFFICE O/P EST LOW 20 MIN: CPT | Performed by: ORTHOPAEDIC SURGERY

## 2025-04-10 NOTE — ASSESSMENT & PLAN NOTE
Patient sustained a left posterior malleolus fracture s/p fall 6 weeks ago 2/25/2025   X-rays from today reviewed with the patient.   Patient has made significant progress.  Continue with PT focusing on strengthening.  Ice, tylenol/NSAIDs as needed for pain.  Follow up PRN.

## 2025-04-10 NOTE — PROGRESS NOTES
Name: Elena Bhardwaj      : 1955       MRN: [id number]   Encounter Provider: Carlos Lowry MD   Encounter Date: 04/10/25  Encounter department: [facility]         Assessment & Plan  Closed fracture of posterior malleolus of left tibia, initial encounter  Patient sustained a left posterior malleolus fracture s/p fall 6 weeks ago 2025   X-rays from today reviewed with the patient.   Patient has made significant progress.  Continue with PT focusing on strengthening.  Ice, tylenol/NSAIDs as needed for pain.  Follow up PRN.          To Do Next Visit:  PRN    _____________________________________________________  CHIEF COMPLAINT:  Chief Complaint   Patient presents with    Left Ankle - Follow-up     Patients states she has had improvement with PT          SUBJECTIVE:  Elena Bhardwaj is a 69 y.o. female who presents for follow up evaluation of the above listed condition. She states that she is doing extremely well at this time. She states she feels that she has made 80% improvement since the time of her fracture. She states that she is completing PT with significant improvement. She states that she still has some discomfort with uneven surfaces and going up steps. She denies any new injury or trauma to the ankle. She denies any numbness or tingling.     PAST MEDICAL HISTORY:  Past Medical History:   Diagnosis Date    Cancer (HCC)     Diabetes mellitus (HCC)     Dizziness     Hypertension     Sleep apnea     Sleep difficulties     Stroke (HCC)        PAST SURGICAL HISTORY:  Past Surgical History:   Procedure Laterality Date    TONSILLECTOMY         FAMILY HISTORY:  Family History   Problem Relation Age of Onset    Stroke Paternal Grandmother         death 70    Stroke Maternal Aunt         death 66       SOCIAL HISTORY:  Social History     Tobacco Use    Smoking status: Never    Smokeless tobacco: Never   Substance Use Topics    Alcohol use: Never    Drug use: Never  "      MEDICATIONS:    Current Outpatient Medications:     apixaban (ELIQUIS) 5 mg, Take 5 mg by mouth 2 (two) times a day, Disp: , Rfl:     atorvastatin (LIPITOR) 10 mg tablet, Take 10 mg by mouth daily at bedtime Pt only take 5 mg cuts pill in half, Disp: , Rfl:     Cholecalciferol (Vitamin D-3) 25 MCG (1000 UT) CAPS, Take 1,000 Units by mouth 2 (two) times a day, Disp: , Rfl:     losartan (COZAAR) 25 mg tablet, Take 1 tablet (25 mg total) by mouth daily Do not start before March 9, 2024. (Patient taking differently: Take 25 mg by mouth daily Patient take 1/2 tablet daily), Disp: 30 tablet, Rfl: 0    metFORMIN (GLUCOPHAGE) 500 mg tablet, Take 500 mg by mouth 2 (two) times a day with meals, Disp: , Rfl:     sotalol (BETAPACE) 80 mg tablet, Take 40 mg by mouth every 12 (twelve) hours Pt takes 40 mg twice a day, Disp: , Rfl:     Multiple Vitamins-Minerals (Multi For Her 50+) TABS, , Disp: , Rfl:     vitamin A 2400 MCG (8000 UT) capsule, Take 8,000 Units by mouth daily (Patient not taking: Reported on 3/19/2024), Disp: , Rfl:     ALLERGIES:  Allergies   Allergen Reactions    [facility] ([facility]) - Food Allergy Shortness Of Breath     Reports SOB happened about 30 years ago.    Lisinopril Cough     Gets a chronic cough and SOB.    Semaglutide Dizziness     dizziness    Clindamycin Rash       LABS:  HgA1c:   Lab Results   Component Value Date    HGBA1C 6.7 (H) 12/04/2024     BMP:   Lab Results   Component Value Date    CALCIUM 10 12/04/2024    K 4.1 12/04/2024    CO2 29 12/04/2024     12/04/2024    BUN 16 12/04/2024    CREATININE 0.84 12/04/2024     CBC: No components found for: \"CBC\"    _____________________________________________________  PHYSICAL EXAMINATION:  Vital signs: Ht 5' 3\" (1.6 m)   Wt 102 kg (225 lb 8 oz)   BMI 39.95 kg/m²   General: No acute distress, awake and alert  Psychiatric: Mood and affect appear appropriate  HEENT: Trachea Midline, No torticollis, no apparent facial trauma  Cardiovascular: " No audible murmurs; Extremities appear perfused  Pulmonary: No audible wheezing or stridor  Skin: No open lesions; see further details (if any) below    MUSCULOSKELETAL EXAMINATION:  Extremities:  Left Ankle  Patient sitting in room with no visible signs of distress.  No TTP  ROM full with slight pain in the PF  Strength 5/5  No pertinent positive special tests.  NV intact.      _____________________________________________________  STUDIES REVIEWED:  I personally reviewed the images obtained in office today and my independent interpretation is as follows:  XR Left ankle 3+ view: X-rays demonstrates healed fracture of the posterior medial malleolus with good callus formation. No acute osseus abnormalities.       PROCEDURES PERFORMED:  Procedures      Scribe Attestation      I,:  Deonte Gaona am acting as a scribe while in the presence of the attending physician.:       I,:  Carlos Lowry MD personally performed the services described in this documentation    as scribed in my presence.:

## 2025-04-11 ENCOUNTER — OFFICE VISIT (OUTPATIENT)
Dept: PHYSICAL THERAPY | Facility: CLINIC | Age: 70
End: 2025-04-11
Payer: COMMERCIAL

## 2025-04-11 DIAGNOSIS — S82.392D CLOSED FRACTURE OF POSTERIOR MALLEOLUS OF LEFT TIBIA WITH ROUTINE HEALING, SUBSEQUENT ENCOUNTER: Primary | ICD-10-CM

## 2025-04-11 PROCEDURE — 97112 NEUROMUSCULAR REEDUCATION: CPT

## 2025-04-11 PROCEDURE — 97140 MANUAL THERAPY 1/> REGIONS: CPT

## 2025-04-11 PROCEDURE — 97110 THERAPEUTIC EXERCISES: CPT

## 2025-04-11 NOTE — PROGRESS NOTES
Daily Note     Today's date: 2025  Patient name: Elena Bhardwaj  : 1955  MRN: [id number]  Referring provider: Nidia Rothman P*  Dx:   Encounter Diagnosis     ICD-10-CM    1. Closed fracture of posterior malleolus of left tibia with routine healing, subsequent encounter  S82.392D           Start Time: 930  Stop Time: 1015  Total time in clinic (min): 45 minutes    Subjective: Patient reports her follow up with the ortho went well, only has to return as needed. Feels 80% improvements at this point. Would like to continue with therapy for feel more comfortable on uneven surfaces.       Objective: See treatment diary below      Assessment: Tolerated treatment well. Patient demonstrated fatigue post treatment, exhibited good technique with therapeutic exercises, and would benefit from continued PT. Continues to provide good effort during performance of exercises. Added EO/EC on foam this session to which patient noted increased challenge, but responded well. Visual and verbal cues provided to ensure correct form during performance of new exercise. Will continue to assess patient tolerance and progress next visit, as able.         Plan: Continue per plan of care.      Precautions: Hx DM, A Fib    POC expires Unit limit Auth Expiration date PT/OT + Visit Limit?    N/A  BOMN                 Visit/Unit Tracking  AUTH Status:  Date 3/17 3/20 3/24 3/27 3/31 4/3 4/7 4/11       16 visits  Used 1 2 3 4 5 6 7 8        Remaining  15 14 13 12 11 10 9 8         FOTO:       Manuals 3/17 3/20  3/24 3/27 3/31 4/3 4/7      L ankle PROM  TB all dir     TB all dir  TB all dir      Retrograde massage L ankle/calf   TB  TB                                  Neuro Re-Ed             Pt education regarding HEP, POC, and dx  5'  5' 5' 5' 5' 5' 5'  5'     Ankle alphabets  X1 round X2 round X2 round  HEP today X2 rounds        LAQ  2x10 2x10 HEP         Standing weight shifts     3x10 3x10  3x10 3x10 HEP     Standing HR  "   3x10  3x10  3x10 3x10  3x10      SLS        3x 15\" L LE  3x 15\" L LE     EO/EC on foam         3x 30sec dbl leg      Ther Ex             Ankle pumps  3x10 3x10  3x10 HEP today 3x10  3x10 HEP      Ankle circles  3x10  3x10  3x10 HEP today 3x10  3x10 HEP      Seated marches   3x10 L LE  3x10 L LE  HEP         Wobble board DF/PF, Inv/ev, circles    X20 ea  X30 ea  X30 ea  X30 ea  X30 ea      Short foots     X20 3\"  X20 3\" X20 3\"  X20 3\"  HEP      Seated plantar fascia stretch     10\" x 10 L  10\" x 10 L  HEP        Seated TB inv/eversion     2x10 grn  2x10 grn  2x10 grn  2x10 grn      Long sitting calf stretch      10\" x 10 L LE  10\" x 10 L LE  HEP      Seated TB DF/PF      3x10 grn  3x10 grn  3x10 grn      Standing slantboard calf stretch       5\" x 20 5\" x 20  5\" x 20     Rec bike for L ankle ROM and strength     5'  6'  7'  6' 6'      Ther Activity             Step ups     2x10 4\"  2x10 6\"  2x10 6\"  2x10 6\"  2x10 8\"      Step down     2x10 4\"  2x10 4\"  2x10 4\"  2x10 4\"      Gait Training                                       Modalities                                                        "

## 2025-04-14 ENCOUNTER — OFFICE VISIT (OUTPATIENT)
Dept: PHYSICAL THERAPY | Facility: CLINIC | Age: 70
End: 2025-04-14
Payer: COMMERCIAL

## 2025-04-14 DIAGNOSIS — S82.392D CLOSED FRACTURE OF POSTERIOR MALLEOLUS OF LEFT TIBIA WITH ROUTINE HEALING, SUBSEQUENT ENCOUNTER: Primary | ICD-10-CM

## 2025-04-14 PROCEDURE — 97112 NEUROMUSCULAR REEDUCATION: CPT

## 2025-04-14 PROCEDURE — 97110 THERAPEUTIC EXERCISES: CPT

## 2025-04-14 NOTE — PROGRESS NOTES
"Daily Note     Today's date: 2025  Patient name: Elena Bhardwaj  : 1955  MRN: [id number]  Referring provider: Nidia Rothman P*  Dx:   Encounter Diagnosis     ICD-10-CM    1. Closed fracture of posterior malleolus of left tibia with routine healing, subsequent encounter  S82.392D           Start Time: 930  Stop Time: 1016  Total time in clinic (min): 46 minutes    Subjective: Patient reports she had increased pain and swelling in her ankle Friday and Saturday, but notes yesterday and today it feels a little bit better.       Objective: See treatment diary below      Assessment: Tolerated treatment well. Patient demonstrated fatigue post treatment, exhibited good technique with therapeutic exercises, and would benefit from continued PT. Continues to provide good effort during performance of exercises. Added tandem balance this session to which patient noted increased challenge, but responded well. Will continue to assess patient tolerance and progress next visit, as able.         Plan: Continue per plan of care.      Precautions: Hx DM, A Fib    POC expires Unit limit Auth Expiration date PT/OT + Visit Limit?    N/A  BOMN                 Visit/Unit Tracking  AUTH Status:  Date 3/17 3/20 3/24 3/27 3/31 4/3 4      16 visits  Used 1 2 3 4 5 6 7 8 9       Remaining  15 14 13 12 11 10 9 8 7        FOTO:       Manuals 3/17 3/20  3/24 3/27 3/31 43 4/7     L ankle PROM  TB all dir     TB all dir  TB all dir      Retrograde massage L ankle/calf   TB  TB                                  Neuro Re-Ed             Pt education regarding HEP, POC, and dx  5'  5' 5' 5' 5' 5' 5'  5' 5'     Ankle alphabets  X1 round X2 round X2 round  HEP today X2 rounds        LAQ  2x10 2x10 HEP         Standing weight shifts     3x10 3x10  3x10 3x10 HEP 2x10     Standing HR    3x10  3x10  3x10 3x10  3x10  3x10     SLS        3x 15\" L LE  3x 15\" L LE 3x 15\" L LE    EO/EC on foam         3x 30sec dbl leg  " "3x 30sec dbl leg     Tandem balance          3x20\"    Ther Ex             Ankle pumps  3x10 3x10  3x10 HEP today 3x10  3x10 HEP      Ankle circles  3x10  3x10  3x10 HEP today 3x10  3x10 HEP      Seated marches   3x10 L LE  3x10 L LE  HEP         Wobble board DF/PF, Inv/ev, circles    X20 ea  X30 ea  X30 ea  X30 ea  X30 ea  X30 ea     Short foots     X20 3\"  X20 3\" X20 3\"  X20 3\"  HEP  X20 3\"     Seated plantar fascia stretch     10\" x 10 L  10\" x 10 L  HEP        Seated TB inv/eversion     2x10 grn  2x10 grn  2x10 grn  2x10 grn  2x10 grn     Long sitting calf stretch      10\" x 10 L LE  10\" x 10 L LE  HEP      Seated TB DF/PF      3x10 grn  3x10 grn  3x10 grn  2x10 grn     Standing slantboard calf stretch       5\" x 20 5\" x 20  5\" x 20 5\" x20    Rec bike for L ankle ROM and strength     5'  6'  7'  6' 6'  6'     Ther Activity             Step ups     2x10 4\"  2x10 6\"  2x10 6\"  2x10 6\"  2x10 8\"  2x10 8\"    Step down     2x10 4\"  2x10 4\"  2x10 4\"  2x10 4\"  2x10 4\"     Gait Training                                       Modalities                                                          "

## 2025-04-17 ENCOUNTER — OFFICE VISIT (OUTPATIENT)
Dept: PHYSICAL THERAPY | Facility: CLINIC | Age: 70
End: 2025-04-17
Payer: COMMERCIAL

## 2025-04-17 DIAGNOSIS — S82.392D CLOSED FRACTURE OF POSTERIOR MALLEOLUS OF LEFT TIBIA WITH ROUTINE HEALING, SUBSEQUENT ENCOUNTER: Primary | ICD-10-CM

## 2025-04-17 PROCEDURE — 97530 THERAPEUTIC ACTIVITIES: CPT

## 2025-04-17 PROCEDURE — 97112 NEUROMUSCULAR REEDUCATION: CPT

## 2025-04-17 PROCEDURE — 97110 THERAPEUTIC EXERCISES: CPT

## 2025-04-17 NOTE — PROGRESS NOTES
"Daily Note     Today's date: 2025  Patient name: Elena Bhardwaj  : 1955  MRN: [id number]  Referring provider: Nidia Rothman P*  Dx:   Encounter Diagnosis     ICD-10-CM    1. Closed fracture of posterior malleolus of left tibia with routine healing, subsequent encounter  S82.392D           Start Time: 1100  Stop Time: 1145  Total time in clinic (min): 45 minutes    Subjective: Patient reports her ankle has been feeling pretty good. She notes some discomfort with descending the stairs.       Objective: See treatment diary below      Assessment: Tolerated treatment well. Patient demonstrated fatigue post treatment, exhibited good technique with therapeutic exercises, and would benefit from continued PT. Continues to provide good effort during performance of exercises. Progressed tandem stance to include airex to which patient noted increased challenge, but responded well. Will continue to assess patient tolerance and progress next visit, as able.         Plan: Continue per plan of care.      Precautions: Hx DM, A Fib    POC expires Unit limit Auth Expiration date PT/OT + Visit Limit?    N/A  BOMN                 Visit/Unit Tracking  AUTH Status:  Date 3/17 3/20 3/24 3/27 3/31 4/3 4      16 visits  Used 1 2 3 4 5 6 7 8 9       Remaining  15 14 13 12 11 10 9 8 7        FOTO:       Manuals 3/17 3/20  3/24 3/27 3/31 43 4/7     L ankle PROM  TB all dir     TB all dir  TB all dir      Retrograde massage L ankle/calf   TB  TB                                  Neuro Re-Ed             Pt education regarding HEP, POC, and dx  5'  5' 5' 5' 5' 5' 5'  5' 5'  5'    Ankle alphabets  X1 round X2 round X2 round  HEP today X2 rounds        LAQ  2x10 2x10 HEP         Standing weight shifts     3x10 3x10  3x10 3x10 HEP 2x10     Standing HR    3x10  3x10  3x10 3x10  3x10  3x10  3x10    SLS        3x 15\" L LE  3x 15\" L LE 3x 15\" L LE 3x 15\" L LE   EO/EC on foam         3x 30sec dbl leg  3x " "30sec dbl leg     Tandem balance          3x20\" 3x30\" c airex    Ther Ex             Ankle pumps  3x10 3x10  3x10 HEP today 3x10  3x10 HEP      Ankle circles  3x10  3x10  3x10 HEP today 3x10  3x10 HEP      Seated marches   3x10 L LE  3x10 L LE  HEP         Wobble board DF/PF, Inv/ev, circles    X20 ea  X30 ea  X30 ea  X30 ea  X30 ea  X30 ea  X30 ea    Short foots     X20 3\"  X20 3\" X20 3\"  X20 3\"  HEP  X20 3\"  X20 3\"    Seated plantar fascia stretch     10\" x 10 L  10\" x 10 L  HEP        Seated TB inv/eversion     2x10 grn  2x10 grn  2x10 grn  2x10 grn  2x10 grn  HEP   Long sitting calf stretch      10\" x 10 L LE  10\" x 10 L LE  HEP      Seated TB DF/PF      3x10 grn  3x10 grn  3x10 grn  2x10 grn  HEP   Standing slantboard calf stretch       5\" x 20 5\" x 20  5\" x 20 5\" x20 5\" x20   Rec bike for L ankle ROM and strength     5'  6'  7'  6' 6'  6'  6'    Ther Activity             Step ups     2x10 4\"  2x10 6\"  2x10 6\"  2x10 6\"  2x10 8\"  2x10 8\" 3x10 8\"   Step down     2x10 4\"  2x10 4\"  2x10 4\"  2x10 4\"  2x10 4\"  2x10 4\"    Gait Training                                       Modalities                                                            "

## 2025-04-21 ENCOUNTER — OFFICE VISIT (OUTPATIENT)
Dept: PHYSICAL THERAPY | Facility: CLINIC | Age: 70
End: 2025-04-21
Attending: PHYSICIAN ASSISTANT
Payer: COMMERCIAL

## 2025-04-21 DIAGNOSIS — S82.392D CLOSED FRACTURE OF POSTERIOR MALLEOLUS OF LEFT TIBIA WITH ROUTINE HEALING, SUBSEQUENT ENCOUNTER: Primary | ICD-10-CM

## 2025-04-21 PROCEDURE — 97112 NEUROMUSCULAR REEDUCATION: CPT

## 2025-04-21 PROCEDURE — 97110 THERAPEUTIC EXERCISES: CPT

## 2025-04-21 NOTE — PROGRESS NOTES
Daily Note     Today's date: 2025  Patient name: Elena Bhardwaj  : 1955  MRN: [id number]  Referring provider: Nidia Rothman P*  Dx:   Encounter Diagnosis     ICD-10-CM    1. Closed fracture of posterior malleolus of left tibia with routine healing, subsequent encounter  S82.392D           Start Time: 0930  Stop Time: 1015  Total time in clinic (min): 45 minutes    Subjective: Patient reports having some pain in L ankle -3/10.      Objective: See treatment diary below      Assessment: Tolerated treatment well.   Patient participated in skilled PT session focused on strengthening, stretching, and ROM.  Patient able to complete exercise program with no increase in pain.  Patient demonstrates improved L ankle ROM with exercises.  Patient experiences a mild increase in pain and tightness when descending stairs.  Patient challenged on compliant surfaces with ankle stability on LLE.  Patient would continue to benefit from skilled PT interventions to address strengthening, stretching and ROM. Patient demonstrated fatigue post treatment and exhibited good technique with therapeutic exercises      Plan: Continue per plan of care.      Precautions: Hx DM, A Fib    POC expires Unit limit Auth Expiration date PT/OT + Visit Limit?    N/A  BOMN                 Visit/Unit Tracking  AUTH Status:  Date 3/17 3/20 3/24 3/27 3/31 4/3 4     16 visits  Used 1 2 3 4 5 6 7 8 9 10      Remaining  15 14 13 12 11 10 9 8 7 6       FOTO:       Manuals 4/21 3/20  3/24 3/27 3/31 43 47     L ankle PROM  TB all dir     TB all dir  TB all dir      Retrograde massage L ankle/calf   TB  TB                                  Neuro Re-Ed             Pt education regarding HEP, POC, and dx  5' 5' 5' 5' 5' 5' 5'  5' 5'  5'    Ankle alphabets   X2 round X2 round  HEP today X2 rounds        LAQ  2x10 2x10 HEP         Standing weight shifts     3x10 3x10  3x10 3x10 HEP 2x10     Standing HR 3x10   3x10   "3x10  3x10 3x10  3x10  3x10  3x10    SLS  15\" 3x LLE      3x 15\" L LE  3x 15\" L LE 3x 15\" L LE 3x 15\" L LE   EO/EC on foam  Foam FT/EO/EC 30\" 3x ea       3x 30sec dbl leg  3x 30sec dbl leg     Tandem balance  30\" 3x ea on foam        3x20\" 3x30\" c airex    Ther Ex             Ankle pumps   3x10  3x10 HEP today 3x10  3x10 HEP      Ankle circles   3x10  3x10 HEP today 3x10  3x10 HEP      Seated marches   3x10 L LE  3x10 L LE  HEP         Wobble board DF/PF, Inv/ev, circles 30x ea   X20 ea  X30 ea  X30 ea  X30 ea  X30 ea  X30 ea  X30 ea    Short foots  3\" 30x   X20 3\"  X20 3\" X20 3\"  X20 3\"  HEP  X20 3\"  X20 3\"    Seated plantar fascia stretch     10\" x 10 L  10\" x 10 L  HEP        Seated TB inv/eversion     2x10 grn  2x10 grn  2x10 grn  2x10 grn  2x10 grn  HEP   Long sitting calf stretch      10\" x 10 L LE  10\" x 10 L LE  HEP      Seated TB DF/PF      3x10 grn  3x10 grn  3x10 grn  2x10 grn  HEP   Standing slantboard calf stretch  30\" 3x      5\" x 20 5\" x 20  5\" x 20 5\" x20 5\" x20   Rec bike for L ankle ROM and strength  6'   5'  6'  7'  6' 6'  6'  6'    Ther Activity             Step ups  8\"   3x10   2x10 4\"  2x10 6\"  2x10 6\"  2x10 6\"  2x10 8\"  2x10 8\" 3x10 8\"   Step down 6\"   2x10    2x10 4\"  2x10 4\"  2x10 4\"  2x10 4\"  2x10 4\"  2x10 4\"    Gait Training                                       Modalities                                                              "

## 2025-04-24 ENCOUNTER — OFFICE VISIT (OUTPATIENT)
Dept: PHYSICAL THERAPY | Facility: CLINIC | Age: 70
End: 2025-04-24
Attending: PHYSICIAN ASSISTANT
Payer: COMMERCIAL

## 2025-04-24 DIAGNOSIS — S82.392D CLOSED FRACTURE OF POSTERIOR MALLEOLUS OF LEFT TIBIA WITH ROUTINE HEALING, SUBSEQUENT ENCOUNTER: Primary | ICD-10-CM

## 2025-04-24 PROCEDURE — 97112 NEUROMUSCULAR REEDUCATION: CPT

## 2025-04-24 PROCEDURE — 97110 THERAPEUTIC EXERCISES: CPT

## 2025-04-24 NOTE — PROGRESS NOTES
"Daily Note     Today's date: 2025  Patient name: Elena Bhardwaj  : 1955  MRN: [id number]  Referring provider: Nidia Rothman P*  Dx:   Encounter Diagnosis     ICD-10-CM    1. Closed fracture of posterior malleolus of left tibia with routine healing, subsequent encounter  S82.392D           Start Time: 930  Stop Time: 1016  Total time in clinic (min): 46 minutes    Subjective: Patient reports she hasn't had any pain in her ankle.       Objective: See treatment diary below      Assessment: Tolerated treatment well. Patient demonstrated fatigue post treatment, exhibited good technique with therapeutic exercises, and would benefit from continued PT. Continues to provide good effort during performance of exercises. Minimal verbal cues needed to correct patient form during performance of exercises. Will continue to assess patient tolerance and progress next visit, as able.         Plan: Continue per plan of care.      Precautions: Hx DM, A Fib    POC expires Unit limit Auth Expiration date PT/OT + Visit Limit?    N/A  BOMN                 Visit/Unit Tracking  AUTH Status:  Date 3/17 3/20 3/24 3/27 3/31 4/3 4/7 4/11 4/14 4/17 4/21 4/24   16 visits  Used 1 2 3 4 5 6 7 8 9 10 11 12    Remaining  15 14 13 12 11 10 9 8 7 6 5 4     FOTO:       Manuals 4/21 4/24   3/31 4/3 4/7 4/11 4/14 4/17    L ankle PROM       TB all dir  TB all dir      Retrograde massage L ankle/calf     TB                                  Neuro Re-Ed             Pt education regarding HEP, POC, and dx  5' 5'   5' 5' 5'  5' 5'  5'    Ankle alphabets      X2 rounds        LAQ             Standing weight shifts      3x10  3x10 3x10 HEP 2x10     Standing HR 3x10 3x10    3x10  3x10 3x10  3x10  3x10  3x10    SLS  15\" 3x LLE 15\" 3x LLE     3x 15\" L LE  3x 15\" L LE 3x 15\" L LE 3x 15\" L LE   EO/EC on foam  Foam FT/EO/EC 30\" 3x ea Foam FT/EO/EC 30\" 3x ea      3x 30sec dbl leg  3x 30sec dbl leg     Tandem balance  30\" 3x ea on foam 30\" 3x ea " "on foam       3x20\" 3x30\" c airex    Ther Ex             Ankle pumps      3x10  3x10 HEP      Ankle circles      3x10  3x10 HEP      Seated marches              Wobble board DF/PF, Inv/ev, circles 30x ea 30x ea    X30 ea  X30 ea  X30 ea  X30 ea  X30 ea  X30 ea    Short foots  3\" 30x 3\" 30x   X20 3\" X20 3\"  X20 3\"  HEP  X20 3\"  X20 3\"    Seated plantar fascia stretch      10\" x 10 L  HEP        Seated TB inv/eversion     2x10 grn  2x10 grn  2x10 grn  2x10 grn  2x10 grn  HEP   Long sitting calf stretch      10\" x 10 L LE  10\" x 10 L LE  HEP      Seated TB DF/PF      3x10 grn  3x10 grn  3x10 grn  2x10 grn  HEP   Standing slantboard calf stretch  30\" 3x  nv    5\" x 20 5\" x 20  5\" x 20 5\" x20 5\" x20   Rec bike for L ankle ROM and strength  6' 5'    6'  7'  6' 6'  6'  6'    Ther Activity             Step ups  8\"   3x10 8\" 3x10    2x10 6\"  2x10 6\"  2x10 6\"  2x10 8\"  2x10 8\" 3x10 8\"   Step down 6\"   2x10 6\" 3x10    2x10 4\"  2x10 4\"  2x10 4\"  2x10 4\"  2x10 4\"  2x10 4\"    Gait Training                                       Modalities                                                                "

## 2025-04-28 ENCOUNTER — OFFICE VISIT (OUTPATIENT)
Dept: PHYSICAL THERAPY | Facility: CLINIC | Age: 70
End: 2025-04-28
Attending: PHYSICIAN ASSISTANT
Payer: COMMERCIAL

## 2025-04-28 DIAGNOSIS — S82.392D CLOSED FRACTURE OF POSTERIOR MALLEOLUS OF LEFT TIBIA WITH ROUTINE HEALING, SUBSEQUENT ENCOUNTER: Primary | ICD-10-CM

## 2025-04-28 PROCEDURE — 97112 NEUROMUSCULAR REEDUCATION: CPT

## 2025-04-28 PROCEDURE — 97110 THERAPEUTIC EXERCISES: CPT

## 2025-04-28 NOTE — PROGRESS NOTES
"Daily Note     Today's date: 2025  Patient name: Elena Bhardwaj  : 1955  MRN: [id number]  Referring provider: Nidia Rothman P*  Dx:   Encounter Diagnosis     ICD-10-CM    1. Closed fracture of posterior malleolus of left tibia with routine healing, subsequent encounter  S82.392D           Start Time: 1145  Stop Time: 1230  Total time in clinic (min): 45 minutes    Subjective: Patient reports her ankle is feeling pretty good.       Objective: See treatment diary below      Assessment: Tolerated treatment well. Patient demonstrated fatigue post treatment, exhibited good technique with therapeutic exercises, and would benefit from continued PT. Continues to provide good effort during performance of exercises. Added s/l leg press this session to which patient noted increased challenge, but responded well. Will continue to assess patient tolerance and progress next visit, as able.         Plan: Continue per plan of care.      Precautions: Hx DM, A Fib    POC expires Unit limit Auth Expiration date PT/OT + Visit Limit?    N/A  BOMN                 Visit/Unit Tracking  AUTH Status:  Date 3/17 3/20 3/24 3/27 3/31 4/3 4/7 4/11 4/14 4/17 4/21 4/24   16 visits  Used 1 2 3 4 5 6 7 8 9 10 11 12    Remaining  15 14 13 12 11 10 9 8 7 6 5 4     FOTO:       Manuals 4/21 4/24 4/28  3/31 4/3 4/7 4/11 4/14 4/17    L ankle PROM       TB all dir  TB all dir      Retrograde massage L ankle/calf     TB                                  Neuro Re-Ed             Pt education regarding HEP, POC, and dx  5' 5' 5'  5' 5' 5'  5' 5'  5'    Ankle alphabets      X2 rounds        LAQ             Standing weight shifts      3x10  3x10 3x10 HEP 2x10     Standing HR 3x10 3x10  3x10   3x10  3x10 3x10  3x10  3x10  3x10    SLS  15\" 3x LLE 15\" 3x LLE 15\" 3x L LE on foam     3x 15\" L LE  3x 15\" L LE 3x 15\" L LE 3x 15\" L LE   EO/EC on foam  Foam FT/EO/EC 30\" 3x ea Foam FT/EO/EC 30\" 3x ea Foam EC 30\" x 3      3x 30sec dbl leg  3x 30sec " "dbl leg     Tandem balance  30\" 3x ea on foam 30\" 3x ea on foam 30\" 3x ea on foam      3x20\" 3x30\" c airex    Ther Ex             Ankle pumps      3x10  3x10 HEP      Ankle circles      3x10  3x10 HEP      Seated marches              Wobble board DF/PF, Inv/ev, circles 30x ea 30x ea  30x ea   X30 ea  X30 ea  X30 ea  X30 ea  X30 ea  X30 ea    Short foots  3\" 30x 3\" 30x   X20 3\" X20 3\"  X20 3\"  HEP  X20 3\"  X20 3\"    Seated plantar fascia stretch      10\" x 10 L  HEP        Seated TB inv/eversion     2x10 grn  2x10 grn  2x10 grn  2x10 grn  2x10 grn  HEP   Long sitting calf stretch      10\" x 10 L LE  10\" x 10 L LE  HEP      Seated TB DF/PF      3x10 grn  3x10 grn  3x10 grn  2x10 grn  HEP   S/l leg press   2x10 L LE 35#           Standing slantboard calf stretch  30\" 3x  nv 5\" x 20    5\" x 20 5\" x 20  5\" x 20 5\" x20 5\" x20   Rec bike for L ankle ROM and strength  6' 5'  TM walk today 6' @ 2.0 mph  6'  7'  6' 6'  6'  6'    Ther Activity             Step ups  8\"   3x10 8\" 3x10  8\" 3x10   2x10 6\"  2x10 6\"  2x10 6\"  2x10 8\"  2x10 8\" 3x10 8\"   Step down 6\"   2x10 6\" 3x10  6\" 3x10   2x10 4\"  2x10 4\"  2x10 4\"  2x10 4\"  2x10 4\"  2x10 4\"    Gait Training                                       Modalities                                                                  "

## 2025-05-01 ENCOUNTER — OFFICE VISIT (OUTPATIENT)
Dept: PHYSICAL THERAPY | Facility: CLINIC | Age: 70
End: 2025-05-01
Attending: PHYSICIAN ASSISTANT
Payer: COMMERCIAL

## 2025-05-01 DIAGNOSIS — S82.392D CLOSED FRACTURE OF POSTERIOR MALLEOLUS OF LEFT TIBIA WITH ROUTINE HEALING, SUBSEQUENT ENCOUNTER: Primary | ICD-10-CM

## 2025-05-01 PROCEDURE — 97530 THERAPEUTIC ACTIVITIES: CPT

## 2025-05-01 PROCEDURE — 97112 NEUROMUSCULAR REEDUCATION: CPT

## 2025-05-01 PROCEDURE — 97110 THERAPEUTIC EXERCISES: CPT

## 2025-05-01 NOTE — PROGRESS NOTES
"Daily Note     Today's date: 2025  Patient name: Elena Bhardwaj  : 1955  MRN: [id number]  Referring provider: Nidia Rothman P*  Dx:   Encounter Diagnosis     ICD-10-CM    1. Closed fracture of posterior malleolus of left tibia with routine healing, subsequent encounter  S82.392D                      Subjective: Patient states she was able to walk in the woods the other day and had no pain.       Objective: See treatment diary below      Assessment: Tolerated treatment well. Exercises performed without shoes to work stability. Some finger tip assist required but no sig instability. Patient demonstrated fatigue post treatment and would benefit from continued PT      Plan: Progress treatment as tolerated.       Precautions: Hx DM, A Fib    POC expires Unit limit Auth Expiration date PT/OT + Visit Limit?    N/A  BOMN                 Visit/Unit Tracking  AUTH Status:  Date 5/1    3/31 4/3 4/7 4/11 4/14 4/17 4/21 4/24   16 visits  Used 13    5 6 7 8 9 10 11 12    Remaining      11 10 9 8 7 6 5 4     FOTO:       Manuals     L ankle PROM        TB all dir      Retrograde massage L ankle/calf                                       Neuro Re-Ed             Pt education regarding HEP, POC, and dx  5' 5' 5' 5'    5' 5'  5'    Ankle alphabets              LAQ             Standing weight shifts         HEP 2x10     Standing HR 3x10 3x10  3x10  3x10 no shoes     3x10  3x10  3x10    SLS  15\" 3x LLE 15\" 3x LLE 15\" 3x L LE on foam  15\" 3x LLE on foam     3x 15\" L LE 3x 15\" L LE 3x 15\" L LE   EO/EC on foam  Foam FT/EO/EC 30\" 3x ea Foam FT/EO/EC 30\" 3x ea Foam EC 30\" x 3  Foam EC 30\"x3    3x 30sec dbl leg  3x 30sec dbl leg     Tandem balance  30\" 3x ea on foam 30\" 3x ea on foam 30\" 3x ea on foam 30\" 3x ea on foam      3x20\" 3x30\" c airex    Ther Ex             Ankle pumps              Ankle circles              Seated march              Wobble board DF/PF, Inv/ev, circles 30x " Plastic & Reconstructive Surgery    REASON FOR VISIT              Chief Complaint   Patient presents with   â¢ Consultation     Cyst on Back          HISTORY OF PRESENT ILLNESS  59year old female with a right upper back subcutaneous lesion and left upper abdomen subcutaneous fullness. The back lesion was first noted - 10 years ago. Growth - Yes over the last 3 weeks with tenderness  Bleeding - No   Symptoms - pain    The left upper abdomen fullness she has had for several years, CT of chest in 2011 showed fatty tissue consistent with lipoma. This has grown over the years and now causes her discomfort as her bra rubs on the area when she sits down. History of previous skin cancers - No   Family history of skin cancers - Mother with BCC, sister melanoma     No h/o abnormal scarring      PAST MEDICAL HISTORY  Past Medical History:   Diagnosis Date   â¢ Allergic rhinoconjunctivitis 2005    Sensitized to sugar maple, aspen cotton, white jossie, blue/Ele grass, cece, orchard grass, red top, sade, dog hair, D. farinae, D. pteronyssinus and on IC to tree mix, giant ragweed, short ragweed, weeds, alternaria, penicillium, cat hair and house dust   â¢ Alopecia areata 5/19/2015   â¢ Asthma     Negative polysomnogram 11/3/2011 ([facility] 1)   â¢ Celiac sprue 9/22/2015   â¢ Cervical spondylosis    â¢ Chronic sinusitis     L facial pain   â¢ Colon polyps 12/15/2015   â¢ Diverticulosis of colon 12/15/2015   â¢ Esophageal stricture     Secondary to clindamycin, required dilatation around 2005. â¢ Family history of colon cancer 12/15/2015    Father had colon cancer.    â¢ Goiter diffuse 6/19/2015   â¢ Hashimoto's thyroiditis, based on US characteristics 5/19/2015   â¢ Hx of migraines 5/20/2015   â¢ Hx of Raynaud's syndrome 5/20/2015   â¢ Hypothyroidism due to Hashimoto's thyroiditis 11/12/2019   â¢ Migraine headache    â¢ Mild intermittent asthma without complication 85/95/1014   â¢ Mitral valve prolapse     diagnosed during medical school   â¢ "ea 30x ea  30x ea  30x ea     X30 ea  X30 ea  X30 ea    Short foots  3\" 30x 3\" 30x 3\" 30x  3\" 30x    HEP  X20 3\"  X20 3\"    Seated plantar fascia stretch              Seated TB inv/eversion        2x10 grn  2x10 grn  HEP   Long sitting calf stretch              Seated TB DF/PF        3x10 grn  2x10 grn  HEP   S/l leg press   2x10 L LE 35#  3x10 L LE 35#         Standing slantboard calf stretch  30\" 3x  nv 5\" x 20  5x20\"    5\" x 20 5\" x20 5\" x20   Rec bike for L ankle ROM and strength  6' 5'  TM walk today 6' @ 2.0 mph TM walk today 6' @ 2.0 mph    6'  6'  6'    Ther Activity             Step ups  8\"   3x10 8\" 3x10  8\" 3x10  8\" 3x10     2x10 8\"  2x10 8\" 3x10 8\"   Step down lat 6\"   2x10 6\" 3x10  6\" 3x10  6\" 3x10    2x10 4\"  2x10 4\"  2x10 4\"    Gait Training                                       Modalities                                                                    " Multiple thyroid nodules    â¢ Myofascial pain    â¢ Osteomyelitis (CMS/HCC) 2004    L maxillary   â¢ Pneumonia 10/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 Chino Campos December 2015   â¢ Varicose veins with pain 11/3/2015   â¢ Vitamin D deficiency 9/22/2015   â¢ Vitamin D insufficiency         SURGICAL HISTORY  Past Surgical History:   Procedure Laterality Date   â¢ Bronchoscopy  2013 Oct   â¢ Cervical cerclage  07/1994   â¢ Colonoscopy  12/14/2012   â¢ Colonoscopy  12/15/2015    repeat 3 years-[facility]   â¢ Colonoscopy  09/24/2019    Screening colonoscopy in 4-5 years. â¢ Colonoscopy  05/10/2022    Screening colonoscopy in 4-5 years. 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 â¢ Sinus surgery  2005    Oroantral fistula        MEDICATIONS  Current Outpatient Medications   Medication Sig Dispense Refill   â¢ COVID-19 mRNA Vac-Aline, Pfizer, (COMIRNATY) 30 MCG/0.3ML vaccine Inject 0.3 mLs into the muscle. 0.3 mL 0   â¢ Multiple Vitamins-Minerals (MULTIVITAL PO) Take 1 tablet by mouth daily. â¢ metroNIDAZOLE (FLAGYL) 500 MG tablet Take 1 tablet by mouth 2 times daily. 28 tablet 0   â¢ hyoscyamine (LEVSIN SL) 0.125 MG sublingual tablet Place 1 tablet under the tongue every 6 hours as needed for Cramping. 120 tablet 11   â¢ LORazepam (ATIVAN) 1 MG tablet Take 1 tablet by mouth nightly as needed (sleep). 90 tablet 0   â¢ levothyroxine 25 MCG tablet Take 1 tablet by mouth daily. 90 tablet 3   â¢ budesonide (PULMICORT) 0.5 MG/2ML nebulizer suspension Mix 1 ampule in sinus irrigation bottle and irrigate twice daily.  (Patient taking differently: Take by nebulization "daily as needed.) 120 mL 11     No current facility-administered medications for this visit. ALLERGIES  ALLERGIES:   Allergen Reactions   â¢ Cefotetan ANAPHYLAXIS   â¢ Sulfa Antibiotics RASH   â¢ Clindamycin Other (See Comments)     ""causes esophageal stricture\""   â¢ Dander Other (See Comments)     asthma   â¢ Dust Other (See Comments)     asthma   â¢ Gluten Meal   (Food Or Med) Other (See Comments)     bloating       SOCIAL HISTORY  Social History     Socioeconomic History   â¢ Marital status: /Civil Union   â¢ Number of children: 2   Occupational History   â¢ Occupation: Dermatologist   Tobacco Use   â¢ Smoking status: Never Smoker   â¢ Smokeless tobacco: Never Used   Vaping Use   â¢ Vaping Use: never used   Substance and Sexual Activity   â¢ Alcohol use: No     Alcohol/week: 0.0 standard drinks   â¢ Drug use: No   â¢ Sexual activity: Yes   Other Topics Concern   â¢ Seat Belt Yes   Social History Narrative    Born and raised in [facility]. Lived in [place] for 3 years before moving to [facility]. 11/6/18: The patient is  and lives with her . 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 She drinks on average one cup of coffee daily and an occasional caffeinated soda but denies any other caffeine consumption. She drinks on average one glass of wine weekly but denies any other alcohol consumption. She is a lifelong nonsmoker and denies any illicit drug use.        FAMILY HISTORY  Family History   Problem Relation Age of Onset   â¢ Sudden Death Father 61        11/2004   â¢ Diabetes Father    â¢ Asthma Mother    â¢ Allergic Rhinitis Mother    â¢ Stroke Mother 61        Basal ganglion infarction   â¢ Asthma Sister    â¢ Asthma Brother    â¢ Cancer, Colon Paternal Aunt 61       REVIEW OF SYSTEMS:  Eye problem(s): negative  ENT problem(s): negative  Cardiovascular problem(s): negative  Respiratory problem(s): negative  Gastrointestinal problem(s): negative  Genitourinary problem(s): " negative  Musculoskeletal problem(s): negative  Integumentary problem(s): negative  Neurological problems(s): negative  Psychiatric problems(s): negative  Endocrine problem(s): positive; hypothyroidism   Hematological and/or Lymphatic problem(s): negative  Recent illnesses or infections: No  Feels generally well: Yes  History of excessive bleeding or frequent bruising: No  History of wound dehiscence: No  History of hypertrophic or keloid scars: No  History of blood clots: No  History of unexplained weight loss or weight gain: No    PHYSICAL EXAM:               General Exam - The patient appears her stated age in no apparent distress. Neurologic - Alert    Psychiatric - Pleasant mood and affect. Musculoskeletal - Ambulates without the use of an assistive device. Extremities - No obvious weakness of the upper or lower extremities. HENT -   Normocephalic. Atraumatic. Bilateral external ears normal.   Back - she has a 0.8 cm x 1.0 cm firm subcutaneous mass of upper right back, no overlying skin changes. nontender to palpation. Abdomen - upper left abdomen subcutaneous fullness that is soft and mobile, nontender, 5 cm  X 9 cm  Neck - Normal range of motion. Respiratory - No respiratory distress, breathing comfortably on room air. ASSESSMENT:  59year old with symptomatic growing right upper back subcutaneous mass and left upper quadrant subcutaneous mass. PATIENT COUNSELING  I discussed the recommended surgical options with the patient. I recommend excision of the back subcutaneous mass and closure. The incisions and follow up care were discussed in detail with the patient. The abdomen mass I recommend going to the operating room with sedation, she may need a drain and will need compression after removal of this area as it is larger.     Risks of excision include but are not limited to: bleeding, infection, poor cosmesis, poor wound healing that requires additional wound care or surgery, pain, numbness, and injury to nerves, bloods vessels or adjacent structures. Seroma is possible with larger areas of removal.    I spent a total of 30 minutes on the day of the visit. This includes pre-charting, chart review and documenting. PLAN  Â· Excise lesion(s) in the office under local anesthetic see separate procedure note  Â· Abdomen subcutaneous mass remove in operating room-pt will let us know if she wishes to proceed. Â· Consent was signed today  Â· Follow up 6-7 days for suture removal.    Silva Monsivais MD MS  Plastic and Reconstructive Surgery        .

## 2025-05-05 ENCOUNTER — OFFICE VISIT (OUTPATIENT)
Dept: PHYSICAL THERAPY | Facility: CLINIC | Age: 70
End: 2025-05-05
Attending: PHYSICIAN ASSISTANT
Payer: COMMERCIAL

## 2025-05-05 DIAGNOSIS — S82.392D CLOSED FRACTURE OF POSTERIOR MALLEOLUS OF LEFT TIBIA WITH ROUTINE HEALING, SUBSEQUENT ENCOUNTER: Primary | ICD-10-CM

## 2025-05-05 PROCEDURE — 97530 THERAPEUTIC ACTIVITIES: CPT

## 2025-05-05 PROCEDURE — 97112 NEUROMUSCULAR REEDUCATION: CPT

## 2025-05-05 PROCEDURE — 97110 THERAPEUTIC EXERCISES: CPT

## 2025-05-05 NOTE — PROGRESS NOTES
"Daily Note     Today's date: 2025  Patient name: Elena Bhardwaj  : 1955  MRN: [id number]  Referring provider: Nidia Rothman P*  Dx:   Encounter Diagnosis     ICD-10-CM    1. Closed fracture of posterior malleolus of left tibia with routine healing, subsequent encounter  S82.392D           Start Time: 930  Stop Time: 1015  Total time in clinic (min): 45 minutes    Subjective: Patient reports her ankle has been feeling good with minimal pain.       Objective: See treatment diary below      Assessment: Tolerated treatment well. Patient demonstrated fatigue post treatment, exhibited good technique with therapeutic exercises, and would benefit from continued PT. Continues to provide good effort during performance of exercises. Minimal verbal cues needed to correct patient form during performance of exercises. Will continue to assess patient tolerance and progress next visit, as able.         Plan: Continue per plan of care.      Precautions: Hx DM, A Fib    POC expires Unit limit Auth Expiration date PT/OT + Visit Limit?    N/A  BOMN                 Visit/Unit Tracking  AUTH Status:  Date 4/28 5/1 5/5  3/31 4/3 4/7 4/11 4/14 4/17 4/21 4/24   16 visits  Used 13 14 15  5 6 7 8 9 10 11 12    Remaining  3 2 1  11 10 9 8 7 6 5 4     FOTO:       Manuals     L ankle PROM        TB all dir      Retrograde massage L ankle/calf                                       Neuro Re-Ed             Pt education regarding HEP, POC, and dx  5' 5' 5' 5' 5'   5' 5'  5'    Ankle alphabets              LAQ             Standing weight shifts         HEP 2x10     Standing HR 3x10 3x10  3x10  3x10 no shoes  3x10 no shoes   3x10  3x10  3x10    SLS  15\" 3x LLE 15\" 3x LLE 15\" 3x L LE on foam  15\" 3x LLE on foam  20\" 3x LLE on foam    3x 15\" L LE 3x 15\" L LE 3x 15\" L LE   EO/EC on foam  Foam FT/EO/EC 30\" 3x ea Foam FT/EO/EC 30\" 3x ea Foam EC 30\" x 3  Foam EC 30\"x3 Foam EC 30\"x3   3x 30sec " "dbl leg  3x 30sec dbl leg     Tandem balance  30\" 3x ea on foam 30\" 3x ea on foam 30\" 3x ea on foam 30\" 3x ea on foam  30\" 3x ea on foam     3x20\" 3x30\" c airex    Ther Ex             Ankle pumps              Ankle circles              Seated Chrysallis              Wobble board DF/PF, Inv/ev, circles 30x ea 30x ea  30x ea  30x ea  30x ea    X30 ea  X30 ea  X30 ea    Short foots  3\" 30x 3\" 30x 3\" 30x  3\" 30x 3\" 30x    HEP  X20 3\"  X20 3\"    Seated plantar fascia stretch              Seated TB inv/eversion        2x10 grn  2x10 grn  HEP   Long sitting calf stretch              Seated TB DF/PF        3x10 grn  2x10 grn  HEP   S/l leg press   2x10 L LE 35#  3x10 L LE 35# 3x10 L LE 35#         Standing slantboard calf stretch  30\" 3x  nv 5\" x 20  5x20\" 5x20\"    5\" x 20 5\" x20 5\" x20   Rec bike for L ankle ROM and strength  6' 5'  TM walk today 6' @ 2.0 mph TM walk today 6' @ 2.0 mph TM walk today 8' @ 2.0 mph   6'  6'  6'    Ther Activity             Step ups  8\"   3x10 8\" 3x10  8\" 3x10  8\" 3x10  8\" 3x10   2x10 8\"  2x10 8\" 3x10 8\"   Step down lat 6\"   2x10 6\" 3x10  6\" 3x10  6\" 3x10 6\" 3x10   2x10 4\"  2x10 4\"  2x10 4\"    Gait Training                                       Modalities                                                                      "

## 2025-05-08 ENCOUNTER — EVALUATION (OUTPATIENT)
Dept: PHYSICAL THERAPY | Facility: CLINIC | Age: 70
End: 2025-05-08
Attending: PHYSICIAN ASSISTANT
Payer: COMMERCIAL

## 2025-05-08 DIAGNOSIS — S82.392D CLOSED FRACTURE OF POSTERIOR MALLEOLUS OF LEFT TIBIA WITH ROUTINE HEALING, SUBSEQUENT ENCOUNTER: Primary | ICD-10-CM

## 2025-05-08 PROCEDURE — 97112 NEUROMUSCULAR REEDUCATION: CPT

## 2025-05-08 PROCEDURE — 97110 THERAPEUTIC EXERCISES: CPT

## 2025-05-08 NOTE — PROGRESS NOTES
PT Re-Evaluation  and PT Discharge    Today's date: 2025  Patient name: Elena Bhardwaj  : 1955  MRN: [id number]  Referring provider: Nidia Rothman P*  Dx:   Encounter Diagnosis     ICD-10-CM    1. Closed fracture of posterior malleolus of left tibia with routine healing, subsequent encounter  S82.392D             Start Time: 930  Stop Time: 1016  Total time in clinic (min): 46 minutes    Assessment    Assessment details: Patient is a 68 y/o female reporting to physical therapy for re-evaluation L ankle pain s/p hairline posterior lateral malleolar fx. Upon re-examination, patient demonstrates improvements in her edema measurements, ROM, and strength of her L ankle, as well as overall pain levels. Patient has also met all therapeutic and functional goals. At this time, patient is to be discharged to a HEP.     Understanding of Dx/Px/POC: good     Prognosis: good    Goals  ST weeks  1. Patient's subjective reports pain levels at worst will decrease by at least 2 levels. MET  2. Patient will become independent with her HEP. MET    LT weeks  1. Patient's L ankle ROM will improve to WFL and strength will improve to at least a 4+/5 for improved gait and tolerance to ambulation on uneven surfaces. MET  2. Patient's FOTO score will improve from a 44 obtained on initial evaluation to a 63 or better indicating improvements in her performance of functional activities. MET    Plan  Therapy options: HEP.  Planned modality interventions: cryotherapy, thermotherapy: hydrocollator packs, unattended electrical stimulation and low level laser therapy    Planned therapy interventions: IASTM, joint mobilization, kinesiology taping, manual therapy, neuromuscular re-education, nerve gliding, patient/caregiver education, strengthening, stretching, therapeutic activities, therapeutic exercise, home exercise program, gait training, functional ROM exercises, flexibility, activity modification and balance/weight  bearing training    Frequency: 1x week  Duration in weeks: 1  Plan of Care beginning date: 2025  Plan of Care expiration date: 2025  Treatment plan discussed with: patient        Subjective Evaluation    History of Present Illness  Mechanism of injury: Patient is a 68 y/o female reporting to PT for re-evaluation of L ankle pain s/p L posterior lateral malleolar hairline fx. She notes improvements in her ability to walk without pain, negotiating stairs, and ambulating on uneven surfaces. She reports the only time she feels pain is when she is laying down at night which resolves with position change. She also notes her ankle swelling has improved with use of compression socks. She notes her only challenge now is descending the stairs, but has been improving with home practice. She is ready to transition to a [facility] at this time.   Quality of life: good    Patient Goals  Patient goals for therapy: decreased pain, decreased edema, improved balance, increased motion, increased strength, independence with ADLs/IADLs and return to sport/leisure activities    Pain  Current pain ratin  At best pain ratin  At worst pain ratin  Location: L lateral ankle  Relieving factors: ice and rest          Objective     Tenderness   Left Ankle/Foot   No tenderness in the anterior ankle, lateral malleolus and medial malleolus.     Active Range of Motion   Left Ankle/Foot   Dorsiflexion (ke): 30 degrees   Plantar flexion: 10 degrees   Inversion: 20 degrees   Eversion: 30 degrees     Strength/Myotome Testing     Left Hip   Planes of Motion   Flexion: 5  Abduction: 5  Adduction: 5    Right Hip   Planes of Motion   Flexion: 5  Abduction: 5  Adduction: 5    Left Knee   Flexion: 5  Extension: 5    Right Knee   Flexion: 5  Extension: 5    Left Ankle/Foot   Dorsiflexion: 5  Plantar flexion: 5  Inversion: 5  Eversion: 5    Right Ankle/Foot   Dorsiflexion: 5  Plantar flexion: 5    Swelling   Left Ankle/Foot   Metatarsal heads: 22  "cm  Figure 8: 57 cm  Malleoli: 28 cm    Ambulation   Weight-Bearing Status   Weight-Bearing Status (Left): full weight bearing   Assistive device used: none    Observational Gait   Gait: within functional limits   Left stance time, left swing time and left step length within functional limits. Increased walking speed and stride length.   Base of support: normal             Precautions: Hx DM, A Fib      POC expires Unit limit Auth Expiration date PT/OT + Visit Limit?   5/12 N/A 5/12 BOMN                 Visit/Unit Tracking  AUTH Status:  Date 4/28 5/1 5/5 5/8 3/31 4/3 4/7 4/11 4/14 4/17 4/21 4/24   16 visits  Used 13 14 15 16 5 6 7 8 9 10 11 12    Remaining  3 2 1 0 11 10 9 8 7 6 5 4     FOTO:       Manuals 4/21 4/24 4/28 5/1 5/5 5/8 4/11 4/14 4/17    L ankle PROM        TB all dir      Retrograde massage L ankle/calf                                       Neuro Re-Ed             Pt education regarding HEP, POC, and dx  5' 5' 5' 5' 5' 5'  5' 5'  5'    Ankle alphabets              LAQ             Standing weight shifts         HEP 2x10     Standing HR 3x10 3x10  3x10  3x10 no shoes  3x10 no shoes   3x10  3x10  3x10    SLS  15\" 3x LLE 15\" 3x LLE 15\" 3x L LE on foam  15\" 3x LLE on foam  20\" 3x LLE on foam    3x 15\" L LE 3x 15\" L LE 3x 15\" L LE   EO/EC on foam  Foam FT/EO/EC 30\" 3x ea Foam FT/EO/EC 30\" 3x ea Foam EC 30\" x 3  Foam EC 30\"x3 Foam EC 30\"x3 Foam EC 30\"x3  3x 30sec dbl leg  3x 30sec dbl leg     Tandem balance  30\" 3x ea on foam 30\" 3x ea on foam 30\" 3x ea on foam 30\" 3x ea on foam  30\" 3x ea on foam  30\" 3x ea on foam    3x20\" 3x30\" c airex    Ther Ex             Ankle pumps              Ankle circles              Seated marchGlomera              Wobble board DF/PF, Inv/ev, circles 30x ea 30x ea  30x ea  30x ea  30x ea  30x ea   X30 ea  X30 ea  X30 ea    Short foots  3\" 30x 3\" 30x 3\" 30x  3\" 30x 3\" 30x  3\" 30x  HEP  X20 3\"  X20 3\"    Seated plantar fascia stretch              Seated TB inv/eversion        2x10 grn " " 2x10 grn  HEP   Long sitting calf stretch              Seated TB DF/PF        3x10 grn  2x10 grn  HEP   S/l leg press   2x10 L LE 35#  3x10 L LE 35# 3x10 L LE 35#         Standing slantboard calf stretch  30\" 3x  nv 5\" x 20  5x20\" 5x20\"  5x20\"   5\" x 20 5\" x20 5\" x20   Rec bike for L ankle ROM and strength  6' 5'  TM walk today 6' @ 2.0 mph TM walk today 6' @ 2.0 mph TM walk today 8' @ 2.0 mph TM walk 8\" @ 2.0 mph  6'  6'  6'    Ther Activity             Step ups  8\"   3x10 8\" 3x10  8\" 3x10  8\" 3x10  8\" 3x10 8\" 3x10  2x10 8\"  2x10 8\" 3x10 8\"   Step down lat 6\"   2x10 6\" 3x10  6\" 3x10  6\" 3x10 6\" 3x10 6\" 3x10   2x10 4\"  2x10 4\"  2x10 4\"    Gait Training                                       Modalities                                             "

## 2025-07-22 ENCOUNTER — TRANSCRIBE ORDERS (OUTPATIENT)
Facility: HOSPITAL | Age: 70
End: 2025-07-22

## 2025-07-22 DIAGNOSIS — Z00.6 ENCOUNTER FOR EXAMINATION FOR NORMAL COMPARISON OR CONTROL IN CLINICAL RESEARCH PROGRAM: Primary | ICD-10-CM
